# Patient Record
Sex: MALE | Race: OTHER | Employment: OTHER | URBAN - METROPOLITAN AREA
[De-identification: names, ages, dates, MRNs, and addresses within clinical notes are randomized per-mention and may not be internally consistent; named-entity substitution may affect disease eponyms.]

---

## 2017-09-05 ENCOUNTER — HOSPITAL ENCOUNTER (OUTPATIENT)
Dept: GENERAL RADIOLOGY | Age: 82
Discharge: HOME | End: 2017-09-05
Attending: FAMILY MEDICINE
Payer: MEDICARE

## 2017-09-05 DIAGNOSIS — M25.50 ARTHRALGIA: ICD-10-CM

## 2017-09-05 PROCEDURE — 73560 X-RAY EXAM OF KNEE 1 OR 2: CPT | Performed by: FAMILY MEDICINE

## 2021-01-18 ENCOUNTER — APPOINTMENT (OUTPATIENT)
Dept: GENERAL RADIOLOGY | Facility: HOSPITAL | Age: 86
End: 2021-01-18
Payer: MEDICARE

## 2021-01-18 ENCOUNTER — HOSPITAL ENCOUNTER (EMERGENCY)
Facility: HOSPITAL | Age: 86
Discharge: HOME | End: 2021-01-19
Attending: EMERGENCY MEDICINE
Payer: MEDICARE

## 2021-01-18 DIAGNOSIS — U07.1 COVID-19: Primary | ICD-10-CM

## 2021-01-18 LAB
ALBUMIN SERPL-MCNC: 2.9 G/DL (ref 3.4–5)
ALBUMIN/GLOB SERPL: 0.7 {RATIO} (ref 1–2)
ALP LIVER SERPL-CCNC: 80 U/L
ALT SERPL-CCNC: 49 U/L
ANION GAP SERPL CALC-SCNC: 8 MMOL/L (ref 0–18)
AST SERPL-CCNC: 36 U/L (ref 15–37)
BASOPHILS # BLD AUTO: 0.02 X10(3) UL (ref 0–0.2)
BASOPHILS NFR BLD AUTO: 0.2 %
BILIRUB SERPL-MCNC: 0.5 MG/DL (ref 0.1–2)
BUN BLD-MCNC: 19 MG/DL (ref 7–18)
BUN/CREAT SERPL: 21.1 (ref 10–20)
CALCIUM BLD-MCNC: 8.9 MG/DL (ref 8.5–10.1)
CHLORIDE SERPL-SCNC: 97 MMOL/L (ref 98–112)
CO2 SERPL-SCNC: 24 MMOL/L (ref 21–32)
CREAT BLD-MCNC: 0.9 MG/DL
DEPRECATED RDW RBC AUTO: 39.8 FL (ref 35.1–46.3)
EOSINOPHIL # BLD AUTO: 0.01 X10(3) UL (ref 0–0.7)
EOSINOPHIL NFR BLD AUTO: 0.1 %
ERYTHROCYTE [DISTWIDTH] IN BLOOD BY AUTOMATED COUNT: 12.4 % (ref 11–15)
GLOBULIN PLAS-MCNC: 4.3 G/DL (ref 2.8–4.4)
GLUCOSE BLD-MCNC: 317 MG/DL (ref 70–99)
HCT VFR BLD AUTO: 42.2 %
HGB BLD-MCNC: 15.1 G/DL
IMM GRANULOCYTES # BLD AUTO: 0.07 X10(3) UL (ref 0–1)
IMM GRANULOCYTES NFR BLD: 0.8 %
LYMPHOCYTES # BLD AUTO: 0.5 X10(3) UL (ref 1–4)
LYMPHOCYTES NFR BLD AUTO: 5.5 %
M PROTEIN MFR SERPL ELPH: 7.2 G/DL (ref 6.4–8.2)
MCH RBC QN AUTO: 31.5 PG (ref 26–34)
MCHC RBC AUTO-ENTMCNC: 35.8 G/DL (ref 31–37)
MCV RBC AUTO: 88.1 FL
MONOCYTES # BLD AUTO: 0.5 X10(3) UL (ref 0.1–1)
MONOCYTES NFR BLD AUTO: 5.5 %
NEUTROPHILS # BLD AUTO: 7.97 X10 (3) UL (ref 1.5–7.7)
NEUTROPHILS # BLD AUTO: 7.97 X10(3) UL (ref 1.5–7.7)
NEUTROPHILS NFR BLD AUTO: 87.9 %
OSMOLALITY SERPL CALC.SUM OF ELEC: 282 MOSM/KG (ref 275–295)
PLATELET # BLD AUTO: 176 10(3)UL (ref 150–450)
POTASSIUM SERPL-SCNC: 4 MMOL/L (ref 3.5–5.1)
RBC # BLD AUTO: 4.79 X10(6)UL
SARS-COV-2 RNA RESP QL NAA+PROBE: DETECTED
SODIUM SERPL-SCNC: 129 MMOL/L (ref 136–145)
WBC # BLD AUTO: 9.1 X10(3) UL (ref 4–11)

## 2021-01-18 PROCEDURE — 99284 EMERGENCY DEPT VISIT MOD MDM: CPT

## 2021-01-18 PROCEDURE — 71045 X-RAY EXAM CHEST 1 VIEW: CPT | Performed by: EMERGENCY MEDICINE

## 2021-01-18 PROCEDURE — 80053 COMPREHEN METABOLIC PANEL: CPT | Performed by: EMERGENCY MEDICINE

## 2021-01-18 PROCEDURE — 85025 COMPLETE CBC W/AUTO DIFF WBC: CPT | Performed by: EMERGENCY MEDICINE

## 2021-01-18 RX ORDER — METHYLPREDNISOLONE 4 MG/1
4 TABLET ORAL AS DIRECTED
COMMUNITY
End: 2021-05-04

## 2021-01-18 RX ORDER — BENZONATATE 100 MG/1
100 CAPSULE ORAL 3 TIMES DAILY PRN
Qty: 30 CAPSULE | Refills: 0 | Status: SHIPPED | OUTPATIENT
Start: 2021-01-18 | End: 2021-02-17

## 2021-01-19 ENCOUNTER — PATIENT OUTREACH (OUTPATIENT)
Dept: CASE MANAGEMENT | Age: 86
End: 2021-01-19

## 2021-01-19 VITALS
HEIGHT: 66 IN | TEMPERATURE: 99 F | WEIGHT: 126 LBS | OXYGEN SATURATION: 95 % | HEART RATE: 66 BPM | DIASTOLIC BLOOD PRESSURE: 64 MMHG | RESPIRATION RATE: 18 BRPM | SYSTOLIC BLOOD PRESSURE: 143 MMHG | BODY MASS INDEX: 20.25 KG/M2

## 2021-01-19 NOTE — PROGRESS NOTES
Home Monitoring Condition Update    Covid19+ test date: 1/18/2021      Consent Verification:  Assessment Completed With: Other: Son Permission received per patient?  verbal  HIPAA Verified?   Yes    COVID-19 HOME MONITORING 1/19/2021   Temperature (No Kevyn experiencing any of the following: fever, weakness, difficulty breathing, hives, joint pain, rashes, itching, tongue or lip swelling or wheezing?  No  If yes, we will contact your doctor/on call provider or if you are experiencing a severe reaction, please

## 2021-01-19 NOTE — ED NOTES
Spoke with son who states that his father has not had any Covid testing but everyone at home is in fact positive. Family states that the patient's O@ sats have been 94-95%. Family states symptoms started 1/8/21 with fevers that come and go.   Son is Sergio Sic

## 2021-01-19 NOTE — ED PROVIDER NOTES
Patient Seen in: [facility] Emergency Department      History   Patient presents with:  Shortness Of Breath    Stated Complaint: sob, covid +, fever.  97% room air    HPI/Subjective:   HPI    Patient is an 82-year-old male with really no past medical supple. Cardiovascular:      Rate and Rhythm: Normal rate and regular rhythm. Heart sounds: Normal heart sounds. Pulmonary:      Effort: Pulmonary effort is normal.      Breath sounds: Normal breath sounds.       Comments: No tachypnea, no distress TECHNIQUE:  AP chest radiograph was obtained. COMPARISON:  None. INDICATIONS:  sob, covid +, fever.  97% room air  PATIENT STATED HISTORY: (As transcribed by Technologist)  Patient here with cough, onset 8-9 days ago, tested positive for covid 3  days ago shortness of breath, palpitations, chest pain. Infusion was paused for about 15 minutes, his heart rate remained rate about 100. It was restarted and now about nursing home in and is still about 100. Does not seem that this is related to the infusion at all.

## 2021-01-19 NOTE — ED INITIAL ASSESSMENT (HPI)
Patient here with cough, onset 8-9 days ago, tested positive for covid 3 days ago. Patient states O2 sat at home 94-95% and instructed by PMD to come in for further evaluation.

## 2021-01-19 NOTE — ED NOTES
Patient now states he is unsure whether he had a covid test at his doctor's office but states everyone at home has Covid.   Patient staets he has had a cough whenever he talks but denies any shortness of breath and states he is moving around without any dif

## 2021-01-20 ENCOUNTER — PATIENT OUTREACH (OUTPATIENT)
Dept: CASE MANAGEMENT | Age: 86
End: 2021-01-20

## 2021-01-20 NOTE — PROGRESS NOTES
Home Monitoring Condition Update    Covid19+ test date: 1/18/2021      Consent Verification:  Assessment Completed With: Other: Son Permission received per patient?  verbal  HIPAA Verified?   Yes    COVID-19 HOME MONITORING 1/20/2021   Temperature (No Kevyn experiencing any of the following: fever, weakness, difficulty breathing, hives, joint pain, rashes, itching, tongue or lip swelling or wheezing?  No  If yes, we will contact your doctor/on call provider or if you are experiencing a severe reaction, please

## 2021-05-04 ENCOUNTER — APPOINTMENT (OUTPATIENT)
Dept: GENERAL RADIOLOGY | Facility: HOSPITAL | Age: 86
DRG: 393 | End: 2021-05-04
Attending: EMERGENCY MEDICINE
Payer: MEDICARE

## 2021-05-04 ENCOUNTER — HOSPITAL ENCOUNTER (INPATIENT)
Facility: HOSPITAL | Age: 86
LOS: 2 days | Discharge: HOME | DRG: 393 | End: 2021-05-06
Attending: EMERGENCY MEDICINE | Admitting: HOSPITALIST
Payer: MEDICARE

## 2021-05-04 DIAGNOSIS — K92.1 HEMATOCHEZIA: ICD-10-CM

## 2021-05-04 DIAGNOSIS — K92.2 GASTROINTESTINAL HEMORRHAGE, UNSPECIFIED GASTROINTESTINAL HEMORRHAGE TYPE: Primary | ICD-10-CM

## 2021-05-04 DIAGNOSIS — D64.9 ANEMIA, UNSPECIFIED TYPE: ICD-10-CM

## 2021-05-04 PROCEDURE — 99223 1ST HOSP IP/OBS HIGH 75: CPT | Performed by: INTERNAL MEDICINE

## 2021-05-04 PROCEDURE — 71045 X-RAY EXAM CHEST 1 VIEW: CPT | Performed by: EMERGENCY MEDICINE

## 2021-05-04 RX ORDER — ONDANSETRON 2 MG/ML
4 INJECTION INTRAMUSCULAR; INTRAVENOUS EVERY 6 HOURS PRN
Status: DISCONTINUED | OUTPATIENT
Start: 2021-05-04 | End: 2021-05-06

## 2021-05-04 RX ORDER — SODIUM CHLORIDE 9 MG/ML
INJECTION, SOLUTION INTRAVENOUS CONTINUOUS
Status: DISCONTINUED | OUTPATIENT
Start: 2021-05-04 | End: 2021-05-06

## 2021-05-04 RX ORDER — METOCLOPRAMIDE HYDROCHLORIDE 5 MG/ML
10 INJECTION INTRAMUSCULAR; INTRAVENOUS EVERY 8 HOURS PRN
Status: DISCONTINUED | OUTPATIENT
Start: 2021-05-04 | End: 2021-05-06

## 2021-05-04 RX ORDER — MELATONIN
3 NIGHTLY PRN
Status: DISCONTINUED | OUTPATIENT
Start: 2021-05-04 | End: 2021-05-06

## 2021-05-04 RX ORDER — ACETAMINOPHEN 325 MG/1
650 TABLET ORAL EVERY 6 HOURS PRN
Status: DISCONTINUED | OUTPATIENT
Start: 2021-05-04 | End: 2021-05-06

## 2021-05-04 NOTE — H&P
[facility]  History and Physical     Mat Misa Patient Status:  Emergency    3/5/1935 MRN [id number]   Location [address] [facility] Attending Deion Schmidt MD   Hosp Day # 0 PCP Bayron Prather MD     Chief Complaint: b /66   Pulse (!) 126   Temp 98.7 °F (37.1 °C)   Resp 22   Ht 5' 6\" (1.676 m)   Wt 130 lb (59 kg)   SpO2 98%   BMI 20.98 kg/m²   General: No acute distress. Alert and oriented x 3. HEENT: Normocephalic atraumatic. Moist mucous membranes.    Neck: No above  1. H/H q 8 hours  2. Transfuse for Hb <7 or sooner if hemodynamically unstable  3. Sinus Tachycardia 2/2 above  1.  Telemetry     Quality:  · DVT Prophylaxis: SCDs  · CODE status: Full  · Arzate: no  · If COVID testing is negative, may discontinue iso

## 2021-05-04 NOTE — ED PROVIDER NOTES
Patient Seen in: [facility] Emergency Department      History   Patient presents with:  GI Bleeding    Stated Complaint: GI BLEED X 2 EPISODES , + DIZZINESS, DENIES BLOOD THINNERS, CURRENTLY FASTING     HPI/Subjective:   HPI    68-year-old male come murmurs, regular rate and rhythm  Lungs: Clear to auscultation bilaterally  Abdomen: Soft nontender nondistended normal active bowel sounds without rebound, guarding or masses noted  Back nontender without CVA tenderness  Extremity no clubbing, cyanosis or -----------         ------                     82 Kate Dawkins (BLOOD [facility])[[id number]]                               Final result               ANTIBODY SCREEN[[id number]]                                  Final result                 Please view results for these nakul MDM      As above  Admission disposition: 5/4/2021  5:18 PM                                  Disposition and Plan     Clinical Impression:  Gastrointestinal hemorrhage, unspecified gastrointestinal hemorrhage type  (primary encounter diagnosis)     Dis

## 2021-05-05 ENCOUNTER — ANESTHESIA EVENT (OUTPATIENT)
Dept: ENDOSCOPY | Facility: HOSPITAL | Age: 86
DRG: 393 | End: 2021-05-05
Payer: MEDICARE

## 2021-05-05 PROCEDURE — 99221 1ST HOSP IP/OBS SF/LOW 40: CPT | Performed by: INTERNAL MEDICINE

## 2021-05-05 PROCEDURE — 99232 SBSQ HOSP IP/OBS MODERATE 35: CPT | Performed by: INTERNAL MEDICINE

## 2021-05-05 RX ORDER — METOPROLOL TARTRATE 5 MG/5ML
INJECTION INTRAVENOUS
Status: DISPENSED
Start: 2021-05-05 | End: 2021-05-05

## 2021-05-05 RX ORDER — METOPROLOL TARTRATE 5 MG/5ML
5 INJECTION INTRAVENOUS ONCE
Status: DISCONTINUED | OUTPATIENT
Start: 2021-05-05 | End: 2021-05-06

## 2021-05-05 NOTE — PLAN OF CARE
Patient is alert and oriented x 4. Denies pain/discomfort at this time. No bowel movements overnight. No complaints of dizziness. Hgb q8h. Clear liquid diet. Made NPO at midnight.       Problem: Patient/Family Goals  Goal: Patient/Family Long Term Goal and response  - Establish method for patient to ask for assistance (call light)  - Provide an  as needed  - Communicate barriers and strategies to overcome with those who interact with patient  5/5/2021 0153 by Rashid Aldana  Outcome: Progressin

## 2021-05-05 NOTE — CONSULTS
[facility]    Report of Cardiology Consultation    Marjory Dakin Patient Status:  Inpatient    3/5/1935 MRN [id number]   [facility] 7NE-A Attending Daniel Vegas MD   Hosp Day # 1 PCP Demetri Mcqueen MD     Date of Admission:   Not on file    Other Topics            Concern    None on file    Social History Narrative    None on file            Current Medications:  metoprolol Tartrate (LOPRESSOR) injection 5 mg, 5 mg, Intravenous, Once  Pantoprazole Sodium (PROTONIX) 40 mg in Sod 05/05/2021     (H) 05/05/2021    CA 7.9 (L) 05/05/2021    ALB 3.2 (L) 05/04/2021    ALKPHO 59 05/04/2021    TP 6.3 (L) 05/04/2021    AST 17 05/04/2021    ALT 21 05/04/2021    PTT 25.8 05/04/2021    INR 1.04 05/04/2021    PTP 13.9 05/04/2021    LIP 6

## 2021-05-05 NOTE — PLAN OF CARE
Assumed care at  0730  A&Ox4, VSS  HR noted to be up to 140s with minimal activity. Pt reports mild dyspnea. EKG confirmed accelerated junctional. Cardiology consulted  Bowel prep started 1600. 1 dark maroon/black stool noted  Plan for procedure tomorrow.

## 2021-05-05 NOTE — PROGRESS NOTES
[facility]  Progress Note     Alexey Velasquez Patient Status:  Inpatient    3/5/1935 MRN [id number]   [facility] 7NE-A Attending Deanna Lobo MD   Hosp Day # 1 PCP Geronimo Braden MD     Chief Complaint: GI bleed    S: Patient w 05/04/2021    COVID19 Detected (A) 01/18/2021       Pro-Calcitonin  No results for input(s): PCT in the last 168 hours. Cardiac  Recent Labs   Lab 05/04/21  1616   TROP <0.045       Creatinine Kinase  No results for input(s): CK in the last 168 hours.

## 2021-05-05 NOTE — CONSULTS
[facility]                       Gastroenterology [address] [facility]    Emmie Wu Patient Status:  Inpatient    3/5/1935 MRN [id number]   [facility] 7NE-A Attending Nan Bird MD   Hosp Day # 1 PCP Rosaline Roth 1,000 mL, Intravenous, Once  Pantoprazole Sodium (PROTONIX) 40 mg in Sodium Chloride (PF) 0.9 % 10 mL IV push, 40 mg, Intravenous, Q12H  0.9% NaCl infusion, , Intravenous, Continuous  acetaminophen (TYLENOL) tab 650 mg, 650 mg, Oral, Q6H PRN  melatonin tab seizure, stroke, or frequent headaches  PE: /56 (BP Location: Left arm)   Pulse 98   Temp 98.2 °F (36.8 °C) (Oral)   Resp 14   Ht 5' 6\" (1.676 m)   Wt 122 lb (55.3 kg)   SpO2 96%   BMI 19.69 kg/m²   Gen: AAO x 3, able to speak in complete sentences Impression: 82year old male with no PMhx and no chronic medications admitted yesterday with bloody stool x2, dark maroon and red blood mixed with stool. No pain however pt with epigastric discomfort a few days prior.  + 5 lb wt loss over the last mon SOB. No prior endoscopic evaluation. No fhx of crc. No nsaids. No anti-thrombotic agents. AOx3. Abd NTND;      Hg 11.7.  Plts 148; INR 1.04  BUN 41      Ddx includes PUD, gastritis for UGI sx; r/o GI malignancy, AVMs, hemorrhoids, diverticula, ulcer;      P

## 2021-05-06 ENCOUNTER — APPOINTMENT (OUTPATIENT)
Dept: CV DIAGNOSTICS | Facility: HOSPITAL | Age: 86
DRG: 393 | End: 2021-05-06
Attending: INTERNAL MEDICINE
Payer: MEDICARE

## 2021-05-06 ENCOUNTER — ANESTHESIA (OUTPATIENT)
Dept: ENDOSCOPY | Facility: HOSPITAL | Age: 86
DRG: 393 | End: 2021-05-06
Payer: MEDICARE

## 2021-05-06 VITALS
HEIGHT: 66 IN | OXYGEN SATURATION: 100 % | WEIGHT: 122 LBS | TEMPERATURE: 98 F | HEART RATE: 94 BPM | RESPIRATION RATE: 17 BRPM | DIASTOLIC BLOOD PRESSURE: 49 MMHG | SYSTOLIC BLOOD PRESSURE: 114 MMHG | BODY MASS INDEX: 19.61 KG/M2

## 2021-05-06 PROCEDURE — 99232 SBSQ HOSP IP/OBS MODERATE 35: CPT | Performed by: NURSE PRACTITIONER

## 2021-05-06 PROCEDURE — 99239 HOSP IP/OBS DSCHRG MGMT >30: CPT | Performed by: INTERNAL MEDICINE

## 2021-05-06 PROCEDURE — 0DB98ZX EXCISION OF DUODENUM, VIA NATURAL OR ARTIFICIAL OPENING ENDOSCOPIC, DIAGNOSTIC: ICD-10-PCS | Performed by: STUDENT IN AN ORGANIZED HEALTH CARE EDUCATION/TRAINING PROGRAM

## 2021-05-06 PROCEDURE — 0DBK8ZZ EXCISION OF ASCENDING COLON, VIA NATURAL OR ARTIFICIAL OPENING ENDOSCOPIC: ICD-10-PCS | Performed by: STUDENT IN AN ORGANIZED HEALTH CARE EDUCATION/TRAINING PROGRAM

## 2021-05-06 PROCEDURE — 0DBH8ZZ EXCISION OF CECUM, VIA NATURAL OR ARTIFICIAL OPENING ENDOSCOPIC: ICD-10-PCS | Performed by: STUDENT IN AN ORGANIZED HEALTH CARE EDUCATION/TRAINING PROGRAM

## 2021-05-06 PROCEDURE — 0DB78ZX EXCISION OF STOMACH, PYLORUS, VIA NATURAL OR ARTIFICIAL OPENING ENDOSCOPIC, DIAGNOSTIC: ICD-10-PCS | Performed by: STUDENT IN AN ORGANIZED HEALTH CARE EDUCATION/TRAINING PROGRAM

## 2021-05-06 PROCEDURE — 93306 TTE W/DOPPLER COMPLETE: CPT | Performed by: INTERNAL MEDICINE

## 2021-05-06 RX ORDER — NALOXONE HYDROCHLORIDE 0.4 MG/ML
80 INJECTION, SOLUTION INTRAMUSCULAR; INTRAVENOUS; SUBCUTANEOUS AS NEEDED
Status: DISCONTINUED | OUTPATIENT
Start: 2021-05-06 | End: 2021-05-06 | Stop reason: HOSPADM

## 2021-05-06 RX ORDER — HYDROCODONE BITARTRATE AND ACETAMINOPHEN 10; 325 MG/1; MG/1
1 TABLET ORAL AS NEEDED
Status: DISCONTINUED | OUTPATIENT
Start: 2021-05-06 | End: 2021-05-06 | Stop reason: HOSPADM

## 2021-05-06 RX ORDER — LIDOCAINE HYDROCHLORIDE 10 MG/ML
INJECTION, SOLUTION EPIDURAL; INFILTRATION; INTRACAUDAL; PERINEURAL AS NEEDED
Status: DISCONTINUED | OUTPATIENT
Start: 2021-05-06 | End: 2021-05-06 | Stop reason: SURG

## 2021-05-06 RX ORDER — HYDROMORPHONE HYDROCHLORIDE 1 MG/ML
0.4 INJECTION, SOLUTION INTRAMUSCULAR; INTRAVENOUS; SUBCUTANEOUS EVERY 5 MIN PRN
Status: DISCONTINUED | OUTPATIENT
Start: 2021-05-06 | End: 2021-05-06 | Stop reason: HOSPADM

## 2021-05-06 RX ORDER — METOPROLOL SUCCINATE 25 MG/1
25 TABLET, EXTENDED RELEASE ORAL
Status: DISCONTINUED | OUTPATIENT
Start: 2021-05-06 | End: 2021-05-06

## 2021-05-06 RX ORDER — DEXTROSE MONOHYDRATE 25 G/50ML
50 INJECTION, SOLUTION INTRAVENOUS
Status: DISCONTINUED | OUTPATIENT
Start: 2021-05-06 | End: 2021-05-06 | Stop reason: HOSPADM

## 2021-05-06 RX ORDER — PANTOPRAZOLE SODIUM 40 MG/1
40 TABLET, DELAYED RELEASE ORAL
Qty: 60 TABLET | Refills: 0 | Status: SHIPPED | OUTPATIENT
Start: 2021-05-06 | End: 2021-05-24 | Stop reason: CLARIF

## 2021-05-06 RX ORDER — ONDANSETRON 2 MG/ML
4 INJECTION INTRAMUSCULAR; INTRAVENOUS AS NEEDED
Status: DISCONTINUED | OUTPATIENT
Start: 2021-05-06 | End: 2021-05-06 | Stop reason: HOSPADM

## 2021-05-06 RX ORDER — PANTOPRAZOLE SODIUM 40 MG/1
40 TABLET, DELAYED RELEASE ORAL
Status: DISCONTINUED | OUTPATIENT
Start: 2021-05-06 | End: 2021-05-06

## 2021-05-06 RX ORDER — SODIUM CHLORIDE, SODIUM LACTATE, POTASSIUM CHLORIDE, CALCIUM CHLORIDE 600; 310; 30; 20 MG/100ML; MG/100ML; MG/100ML; MG/100ML
INJECTION, SOLUTION INTRAVENOUS CONTINUOUS
Status: DISCONTINUED | OUTPATIENT
Start: 2021-05-06 | End: 2021-05-06

## 2021-05-06 RX ORDER — HYDROCODONE BITARTRATE AND ACETAMINOPHEN 10; 325 MG/1; MG/1
2 TABLET ORAL AS NEEDED
Status: DISCONTINUED | OUTPATIENT
Start: 2021-05-06 | End: 2021-05-06 | Stop reason: HOSPADM

## 2021-05-06 RX ORDER — PHENYLEPHRINE HCL 10 MG/ML
VIAL (ML) INJECTION AS NEEDED
Status: DISCONTINUED | OUTPATIENT
Start: 2021-05-06 | End: 2021-05-06 | Stop reason: SURG

## 2021-05-06 RX ORDER — METOCLOPRAMIDE HYDROCHLORIDE 5 MG/ML
10 INJECTION INTRAMUSCULAR; INTRAVENOUS AS NEEDED
Status: DISCONTINUED | OUTPATIENT
Start: 2021-05-06 | End: 2021-05-06 | Stop reason: HOSPADM

## 2021-05-06 RX ORDER — METOPROLOL SUCCINATE 25 MG/1
25 TABLET, EXTENDED RELEASE ORAL
Qty: 30 TABLET | Refills: 1 | Status: SHIPPED | OUTPATIENT
Start: 2021-05-06

## 2021-05-06 RX ORDER — MAGNESIUM OXIDE 400 MG (241.3 MG MAGNESIUM) TABLET
400 TABLET ONCE
Status: DISCONTINUED | OUTPATIENT
Start: 2021-05-06 | End: 2021-05-06

## 2021-05-06 RX ORDER — SODIUM CHLORIDE, SODIUM LACTATE, POTASSIUM CHLORIDE, CALCIUM CHLORIDE 600; 310; 30; 20 MG/100ML; MG/100ML; MG/100ML; MG/100ML
INJECTION, SOLUTION INTRAVENOUS CONTINUOUS PRN
Status: DISCONTINUED | OUTPATIENT
Start: 2021-05-06 | End: 2021-05-06 | Stop reason: SURG

## 2021-05-06 RX ADMIN — PHENYLEPHRINE HCL 100 MCG: 10 MG/ML VIAL (ML) INJECTION at 12:25:00

## 2021-05-06 RX ADMIN — PHENYLEPHRINE HCL 100 MCG: 10 MG/ML VIAL (ML) INJECTION at 12:30:00

## 2021-05-06 RX ADMIN — LIDOCAINE HYDROCHLORIDE 25 MG: 10 INJECTION, SOLUTION EPIDURAL; INFILTRATION; INTRACAUDAL; PERINEURAL at 11:53:00

## 2021-05-06 RX ADMIN — SODIUM CHLORIDE, SODIUM LACTATE, POTASSIUM CHLORIDE, CALCIUM CHLORIDE: 600; 310; 30; 20 INJECTION, SOLUTION INTRAVENOUS at 11:53:00

## 2021-05-06 NOTE — DISCHARGE SUMMARY
[facility]  DISCHARGE SUMMARY     Lilia Leventhal Patient Status:  Inpatient    3/5/1935 MRN [id number]   [facility] 7NE-A Attending Eliu Reed MD   Hosp Day # 2 PCP Jed Mcbride MD     Date of Admission: 2021  Date of Discharge:   · none    Consultants:  • GI, Cardiology    Discharge Medication List:     Discharge Medications      START taking these medications      Instructions Prescription details   Metoprolol Succinate ER 25 MG Tb24  Commonly known as:  Toprol XL

## 2021-05-06 NOTE — ANESTHESIA PREPROCEDURE EVALUATION
PRE-OP EVALUATION    Patient Name: Paras Kovacs    Admit Diagnosis: Gastrointestinal hemorrhage, unspecified gastrointestinal hemorrhage type [K92.2]    Pre-op Diagnosis: Hematochezia [K92.1]  Anemia, unspecified type [D64.9]    ESOPHAGOGASTRODUODENOSCOPY Laterality Date   • REMOVAL GALLBLADDER       Social History    Tobacco Use      Smoking status: Never Smoker      Smokeless tobacco: Never Used    Alcohol use: Never      Drug use: Unknown     Available pre-op labs reviewed.   Lab Results   Component Value

## 2021-05-06 NOTE — PLAN OF CARE
NURSING DISCHARGE NOTE    Discharged Home via Wheelchair. Accompanied by Family member  Belongings Taken by patient/family.   PIV and tele dc'd  Medication changes, follow up appointments, and DC instructions reviewed  Questions and concerns addressed

## 2021-05-06 NOTE — PROGRESS NOTES
[facility] Note: Route Optimization for Pantoprazole (PROTONIX)    Patient is currently on Pantoprazole (PROTONIX) 40 mg IV every 12 hours.    The patient meets the criteria to convert to the oral equivalent as established by the IV to Oral conversion pro

## 2021-05-06 NOTE — OPERATIVE REPORT
EGD Operative Report  Patient Name: Lilia Leventhal  YOB: 1935  MRN: [id number]  Procedure: Esophagogastroduodenoscopy (EGD)  + biopsies  Pre-operative Diagnosis & Indication:   1. Iron Deficiency Anemia   2.  Hematochezia  Post-operative Diagnos adequate sedation was achieved, a bite block was placed and a lubricated tip of an Olympus video upper endoscope was inserted through the oropharynx and gently manipulated through the esophagus into the stomach and the second portion of the duodenum.  Upon today    [place]  5/6/2021  12:05 PM

## 2021-05-06 NOTE — PROGRESS NOTES
[facility]  Cardiology Progress Note    Niesha Bueno Patient Status:  Inpatient    3/5/1935 MRN [id number]   [facility] 7NE-A Attending Beth Bronson MD   [facility] Day # 2 PCP Ruben Dean MD       Subjective:  No chest pain or sh clyde. Echo with preserved lv function, no rwma, mild mr  • GIB- scopes reveal internal hemorrhoids, colon polyps, duodenal ulcer, mild gastritis  • CEFERINO        Plan:     • Would decrease bb dose to 25 mg daily  • Follow tele for recurrent arrhythmias  • Re

## 2021-05-06 NOTE — OPERATIVE REPORT
Colonoscopy Operative Report  Patient Name: Amara Lee  YOB: 1935  MRN: [id number]  Procedure: Colonoscopy w/ polypectomy  Pre-operative Diagnosis & Indication:   1. CEFERINO  2. Hematohcezia  Post-operative Diagnosis:   1. Colon Polyps x 2  2. lubricated tip of the  colonoscope was then introduced into the rectum and advanced to the terminal ileum. The appendiceal orifice and ileocecal valve were clearly and distinctly visualized, thus verifying the cecum. The terminal ileum was intubated.   Angela Lewis hematochezia. Recommendations:    - Post Colonoscopy/polypectomy precautions, watch for bleeding, infection, perforation, adverse drug reactions.    - High fiber diet  - Await pathology results  - Iron supplementation once daily  - Repeat colonoscopy in 3

## 2021-05-06 NOTE — ANESTHESIA POSTPROCEDURE EVALUATION
Fritz Worthy 1 Patient Status:  Inpatient   Age/Gender 82year old male MRN [id number]   Location [address] [place] [address] Attending Candis Molina MD   [facility] Day # 2 PCP Chucky Wilkins MD       Anesthesia Post-op Note

## 2021-05-06 NOTE — PROGRESS NOTES
[facility]  Progress Note     Niesha Bueno Patient Status:  Inpatient    3/5/1935 MRN [id number]   [facility] 7NE-A Attending Beth Bronson MD   Hosp Day # 2 PCP Ruben Dean MD     Chief Complaint: GI bleed    S: Patient w Clearance: 61.4 mL/min (based on SCr of 0.7 mg/dL).     Recent Labs   Lab 05/04/21  1616   PTP 13.9   INR 1.04            COVID-19 Lab Results    COVID-19  Lab Results   Component Value Date    COVID19 Not Detected 05/04/2021    COVID19 Detected (A) 01/18/2

## 2021-05-06 NOTE — PLAN OF CARE
Patient alert and oriented x 4. Denies pain/discomfort at this time. Golytely bowel prep. Patient states stools have been clear but flushes before RN can assess. Consent for EGD/colonoscopy signed and in patient chart. Made NPO at midnight.     0100- p when possible  - Listen attentively, be patient, do not interrupt  - Minimize distractions  - Allow time for understanding and response  - Establish method for patient to ask for assistance (call light)  - Provide an  as needed  - Communicate ba

## 2021-05-11 ENCOUNTER — HOSPITAL ENCOUNTER (EMERGENCY)
Facility: HOSPITAL | Age: 86
Discharge: HOME | End: 2021-05-11
Attending: EMERGENCY MEDICINE
Payer: MEDICARE

## 2021-05-11 ENCOUNTER — APPOINTMENT (OUTPATIENT)
Dept: GENERAL RADIOLOGY | Facility: HOSPITAL | Age: 86
End: 2021-05-11
Attending: EMERGENCY MEDICINE
Payer: MEDICARE

## 2021-05-11 VITALS
TEMPERATURE: 99 F | SYSTOLIC BLOOD PRESSURE: 145 MMHG | HEIGHT: 66 IN | HEART RATE: 59 BPM | OXYGEN SATURATION: 100 % | DIASTOLIC BLOOD PRESSURE: 69 MMHG | RESPIRATION RATE: 20 BRPM | WEIGHT: 122 LBS | BODY MASS INDEX: 19.61 KG/M2

## 2021-05-11 DIAGNOSIS — R60.9 PERIPHERAL EDEMA: Primary | ICD-10-CM

## 2021-05-11 PROCEDURE — 93010 ELECTROCARDIOGRAM REPORT: CPT

## 2021-05-11 PROCEDURE — 71045 X-RAY EXAM CHEST 1 VIEW: CPT | Performed by: EMERGENCY MEDICINE

## 2021-05-11 PROCEDURE — 84484 ASSAY OF TROPONIN QUANT: CPT | Performed by: EMERGENCY MEDICINE

## 2021-05-11 PROCEDURE — 99284 EMERGENCY DEPT VISIT MOD MDM: CPT

## 2021-05-11 PROCEDURE — 83880 ASSAY OF NATRIURETIC PEPTIDE: CPT | Performed by: EMERGENCY MEDICINE

## 2021-05-11 PROCEDURE — 96374 THER/PROPH/DIAG INJ IV PUSH: CPT

## 2021-05-11 PROCEDURE — 85025 COMPLETE CBC W/AUTO DIFF WBC: CPT | Performed by: EMERGENCY MEDICINE

## 2021-05-11 PROCEDURE — 93005 ELECTROCARDIOGRAM TRACING: CPT

## 2021-05-11 PROCEDURE — 80053 COMPREHEN METABOLIC PANEL: CPT | Performed by: EMERGENCY MEDICINE

## 2021-05-11 RX ORDER — FUROSEMIDE 20 MG/1
20 TABLET ORAL 2 TIMES DAILY
Qty: 5 TABLET | Refills: 0 | Status: SHIPPED | OUTPATIENT
Start: 2021-05-11 | End: 2021-05-24 | Stop reason: CLARIF

## 2021-05-11 RX ORDER — FUROSEMIDE 10 MG/ML
40 INJECTION INTRAMUSCULAR; INTRAVENOUS ONCE
Status: COMPLETED | OUTPATIENT
Start: 2021-05-11 | End: 2021-05-11

## 2021-05-11 NOTE — ED INITIAL ASSESSMENT (HPI)
Pt reports he was discharged from the hospital on Thursday for a GI bleed and states reports both his legs started to swell two days ago.

## 2021-05-12 NOTE — ED PROVIDER NOTES
Patient Seen in: [facility] Emergency Department      History   Patient presents with:  Swelling Edema    Stated Complaint: dx on thursday for GI bleed. pt states bilateral swollen legs.       HPI/Subjective:   HPI    42-year-old male presents emerg Exam    All measures to prevent infection transmission during my interaction with the patient were taken. The patient was already wearing a droplet mask on my arrival to the room.  Personal protective equipment including droplet mask, eye protection, and gl Status                     ---------                               -----------         ------                     CBC W/ DIFFERENTIAL[[id number]]          Abnormal            Final result                 Please view results for these tests on the indiv Prescribed:  Discharge Medication List as of 5/11/2021 10:31 PM    START taking these medications    furosemide 20 MG Oral Tab  Take 1 tablet (20 mg total) by mouth 2 (two) times daily. , Normal, Disp-5 tablet, R-0

## 2021-05-18 NOTE — PROGRESS NOTES
Colonoscopy 3 MONTHS, 179[address] [facility] recently had an upper endoscopy (EGD) procedure completed with biopsies of the stomach and small intestine and a colonoscopy procedure completed with polyps removed.      The biopsies obtain

## 2021-05-24 ENCOUNTER — HOSPITAL ENCOUNTER (INPATIENT)
Facility: HOSPITAL | Age: 86
LOS: 8 days | Discharge: HOME HEALTH | DRG: 234 | End: 2021-06-01
Attending: EMERGENCY MEDICINE | Admitting: HOSPITALIST
Payer: MEDICARE

## 2021-05-24 ENCOUNTER — APPOINTMENT (OUTPATIENT)
Dept: GENERAL RADIOLOGY | Facility: HOSPITAL | Age: 86
DRG: 234 | End: 2021-05-24
Attending: EMERGENCY MEDICINE
Payer: MEDICARE

## 2021-05-24 DIAGNOSIS — I21.9 ACUTE MYOCARDIAL INFARCTION, UNSPECIFIED MI TYPE, UNSPECIFIED ARTERY (HCC): ICD-10-CM

## 2021-05-24 DIAGNOSIS — R07.9 CHEST PAIN OF UNCERTAIN ETIOLOGY: Primary | ICD-10-CM

## 2021-05-24 PROBLEM — R73.9 HYPERGLYCEMIA: Status: ACTIVE | Noted: 2021-05-24

## 2021-05-24 PROBLEM — D64.9 ANEMIA: Status: ACTIVE | Noted: 2021-05-24

## 2021-05-24 PROCEDURE — 71045 X-RAY EXAM CHEST 1 VIEW: CPT | Performed by: EMERGENCY MEDICINE

## 2021-05-24 PROCEDURE — 99223 1ST HOSP IP/OBS HIGH 75: CPT | Performed by: INTERNAL MEDICINE

## 2021-05-24 PROCEDURE — 99221 1ST HOSP IP/OBS SF/LOW 40: CPT | Performed by: INTERNAL MEDICINE

## 2021-05-24 RX ORDER — HEPARIN SODIUM AND DEXTROSE 10000; 5 [USP'U]/100ML; G/100ML
12 INJECTION INTRAVENOUS ONCE
Status: COMPLETED | OUTPATIENT
Start: 2021-05-24 | End: 2021-05-24

## 2021-05-24 RX ORDER — ASPIRIN 81 MG/1
324 TABLET, CHEWABLE ORAL ONCE
Status: COMPLETED | OUTPATIENT
Start: 2021-05-24 | End: 2021-05-24

## 2021-05-24 RX ORDER — ONDANSETRON 2 MG/ML
4 INJECTION INTRAMUSCULAR; INTRAVENOUS EVERY 4 HOURS PRN
Status: DISCONTINUED | OUTPATIENT
Start: 2021-05-24 | End: 2021-05-24

## 2021-05-24 RX ORDER — METOCLOPRAMIDE HYDROCHLORIDE 5 MG/ML
10 INJECTION INTRAMUSCULAR; INTRAVENOUS EVERY 8 HOURS PRN
Status: DISCONTINUED | OUTPATIENT
Start: 2021-05-24 | End: 2021-05-26

## 2021-05-24 RX ORDER — NITROGLYCERIN 20 MG/100ML
INJECTION INTRAVENOUS CONTINUOUS
Status: DISCONTINUED | OUTPATIENT
Start: 2021-05-24 | End: 2021-05-24

## 2021-05-24 RX ORDER — ACETAMINOPHEN 325 MG/1
650 TABLET ORAL EVERY 6 HOURS PRN
Status: DISCONTINUED | OUTPATIENT
Start: 2021-05-24 | End: 2021-05-26

## 2021-05-24 RX ORDER — ASPIRIN 325 MG
325 TABLET ORAL DAILY
Status: DISCONTINUED | OUTPATIENT
Start: 2021-05-24 | End: 2021-05-24

## 2021-05-24 RX ORDER — SODIUM CHLORIDE 9 MG/ML
INJECTION, SOLUTION INTRAVENOUS
Status: COMPLETED | OUTPATIENT
Start: 2021-05-25 | End: 2021-05-25

## 2021-05-24 RX ORDER — PANTOPRAZOLE SODIUM 40 MG/1
40 TABLET, DELAYED RELEASE ORAL
COMMUNITY
End: 2021-07-22

## 2021-05-24 RX ORDER — HEPARIN SODIUM 5000 [USP'U]/ML
60 INJECTION INTRAVENOUS; SUBCUTANEOUS ONCE
Status: COMPLETED | OUTPATIENT
Start: 2021-05-24 | End: 2021-05-24

## 2021-05-24 RX ORDER — NITROGLYCERIN 0.4 MG/1
0.4 TABLET SUBLINGUAL EVERY 5 MIN PRN
Status: DISCONTINUED | OUTPATIENT
Start: 2021-05-24 | End: 2021-05-26

## 2021-05-24 RX ORDER — NITROGLYCERIN 20 MG/100ML
5 INJECTION INTRAVENOUS CONTINUOUS
Status: DISCONTINUED | OUTPATIENT
Start: 2021-05-24 | End: 2021-05-26

## 2021-05-24 RX ORDER — ATORVASTATIN CALCIUM 80 MG/1
80 TABLET, FILM COATED ORAL NIGHTLY
Status: DISCONTINUED | OUTPATIENT
Start: 2021-05-24 | End: 2021-06-01

## 2021-05-24 RX ORDER — HEPARIN SODIUM AND DEXTROSE 10000; 5 [USP'U]/100ML; G/100ML
INJECTION INTRAVENOUS CONTINUOUS
Status: DISCONTINUED | OUTPATIENT
Start: 2021-05-24 | End: 2021-05-26

## 2021-05-24 RX ORDER — ASPIRIN 81 MG/1
81 TABLET ORAL DAILY
Status: DISCONTINUED | OUTPATIENT
Start: 2021-05-25 | End: 2021-05-25

## 2021-05-24 RX ORDER — ONDANSETRON 2 MG/ML
4 INJECTION INTRAMUSCULAR; INTRAVENOUS EVERY 6 HOURS PRN
Status: DISCONTINUED | OUTPATIENT
Start: 2021-05-24 | End: 2021-05-26

## 2021-05-24 RX ORDER — NITROGLYCERIN 0.4 MG/1
0.4 TABLET SUBLINGUAL ONCE
Status: COMPLETED | OUTPATIENT
Start: 2021-05-24 | End: 2021-05-24

## 2021-05-24 RX ORDER — MAGNESIUM HYDROXIDE/ALUMINUM HYDROXICE/SIMETHICONE 120; 1200; 1200 MG/30ML; MG/30ML; MG/30ML
30 SUSPENSION ORAL ONCE
Status: COMPLETED | OUTPATIENT
Start: 2021-05-24 | End: 2021-05-24

## 2021-05-24 RX ORDER — LIDOCAINE HYDROCHLORIDE 20 MG/ML
5 SOLUTION OROPHARYNGEAL ONCE
Status: COMPLETED | OUTPATIENT
Start: 2021-05-24 | End: 2021-05-24

## 2021-05-24 RX ORDER — ASPIRIN 81 MG/1
81 TABLET, CHEWABLE ORAL ONCE
Status: COMPLETED | OUTPATIENT
Start: 2021-05-24 | End: 2021-05-24

## 2021-05-24 NOTE — ED PROVIDER NOTES
Patient Seen in: [facility] Emergency Department      History   Patient presents with:  Chest Pain    Stated Complaint: CP    HPI/Subjective:   HPI    This is a 79-year-old male who has been taking 2 antibiotics.   He said that starting last night he now he says the chest pain is gone. The patient is in no respiratory distress. The patient is not septic or toxic    HEENT: Atraumatic, conjunctiva are not pale. There is no icterus. Oral mucosa Is wet. No facial trauma. The neck is supple.     LUNGS: The patient was placed on monitors, IV was started, blood was drawn. MDM      The EKG shows normal sinus rhythm. There is no acute ST elevations or ischemic findings.   The rest of the EKG including rate rhythm axis and intervals minutes of critical care time (exclusive of billable procedures) was administered to manage the patient's critical lab values and unstable vital signs due to his chest pain, elevated troponin.   This involved direct patient intervention, complex decision ma

## 2021-05-24 NOTE — PLAN OF CARE
NURSING ADMISSION NOTE      Patient admitted via Cart  Oriented to room. Safety precautions initiated. Bed in low position. Call light in reach. Admission navigator complete. Skin check performed with Hardik Negrete RN. Skin CDI. Pt A&Ox4.  Impaired hearing appointments  -understand potential side effects of medications  -know when to call physician with side effects   - See additional Care Plan goals for specific interventions  Outcome: Progressing  Goal: Patient/Family Short Term Goal  Description: Patient'

## 2021-05-24 NOTE — ED INITIAL ASSESSMENT (HPI)
Patient reports midsternal chest tightness radiating upwards into throat since last night, initially intermittent and now more constant. States he started tetracycline and flagyl 2 days ago and concerned this is a side effect.  No nausea/vomiting, no dizzin

## 2021-05-24 NOTE — CONSULTS
[facility]  Cardiology Consultation    Madison Corrigan Patient Status:  Emergency    3/5/1935 MRN [id number]   Location [address] [facility] [address] Attending Matt Pittman MD   Hosp Day # 0 PCP Jean-Claude Brooks MD     Reason for drugs.     Allergies:  No Known Allergies    Medications:    Current Facility-Administered Medications:   •  nitroGLYCERIN infusion 50mg in D5W 250ml, 5-400 mcg/min, Intravenous, Continuous  •  heparin (PORCINE) 38384pwhqd/250mL infusion ED INITIAL DOSE, 12 mm in the inferior lead    CXR 5/24/2021: IMPRESSION: Unremarkable portable chest radiograph. Dictated by (CST): Katie Waldron MD on 5/24/2021 at 11:03 AM     Echocardiogram 5/6/2021: Conclusions:   1. Left ventricle:  The cavity size was normal. Wall

## 2021-05-24 NOTE — PROGRESS NOTES
Admission orthos negative       05/24/21 1638 05/24/21 1640 05/24/21 1641   Vital Signs   Pulse 58 62 62   Heart Rate Source  --   --  Monitor   Resp  --   --  16   Respiratory Quality  --   --  Normal   /55 125/63 125/56   MAP (mmHg) 74 83 77   BP L

## 2021-05-24 NOTE — H&P
[facility]  History and Physical     Miguel Rogers Patient Status:  Emergency    3/5/1935 MRN [id number]   Location [address] [facility] Attending José Luis Figueroa MD   Hosp Day # 0 PCP Juan A Tamayo MD     Chief Complaint: systems was completed. Pertinent positives and negatives noted in the HPI.     Physical Exam:    /63   Pulse 65   Temp 98 °F (36.7 °C) (Temporal)   Resp 15   Ht 167.6 cm (5' 6\")   Wt 125 lb (56.7 kg)   SpO2 99%   BMI 20.18 kg/m²   General: No acut Epic.      ASSESSMENT / PLAN:     1. NSTEMI  1. Monitor on telemetry  2. Cardiology to eval  3. Trend troponin  4. Check ECHO, lipids, A1c  5. Continue heparin and nit gtt  6.  Plan for angiogram in AM, if worsening pain or increase in troponin may need ton

## 2021-05-25 ENCOUNTER — ANESTHESIA EVENT (OUTPATIENT)
Dept: CARDIAC SURGERY | Facility: HOSPITAL | Age: 86
DRG: 234 | End: 2021-05-25
Payer: MEDICARE

## 2021-05-25 ENCOUNTER — APPOINTMENT (OUTPATIENT)
Dept: CV DIAGNOSTICS | Facility: HOSPITAL | Age: 86
DRG: 234 | End: 2021-05-25
Attending: INTERNAL MEDICINE
Payer: MEDICARE

## 2021-05-25 ENCOUNTER — APPOINTMENT (OUTPATIENT)
Dept: INTERVENTIONAL RADIOLOGY/VASCULAR | Facility: HOSPITAL | Age: 86
DRG: 234 | End: 2021-05-25
Attending: INTERNAL MEDICINE
Payer: MEDICARE

## 2021-05-25 PROCEDURE — B2111ZZ FLUOROSCOPY OF MULTIPLE CORONARY ARTERIES USING LOW OSMOLAR CONTRAST: ICD-10-PCS | Performed by: INTERNAL MEDICINE

## 2021-05-25 PROCEDURE — 93308 TTE F-UP OR LMTD: CPT | Performed by: INTERNAL MEDICINE

## 2021-05-25 PROCEDURE — 99232 SBSQ HOSP IP/OBS MODERATE 35: CPT | Performed by: STUDENT IN AN ORGANIZED HEALTH CARE EDUCATION/TRAINING PROGRAM

## 2021-05-25 PROCEDURE — B2151ZZ FLUOROSCOPY OF LEFT HEART USING LOW OSMOLAR CONTRAST: ICD-10-PCS | Performed by: INTERNAL MEDICINE

## 2021-05-25 PROCEDURE — 99152 MOD SED SAME PHYS/QHP 5/>YRS: CPT | Performed by: INTERNAL MEDICINE

## 2021-05-25 PROCEDURE — 4A023N7 MEASUREMENT OF CARDIAC SAMPLING AND PRESSURE, LEFT HEART, PERCUTANEOUS APPROACH: ICD-10-PCS | Performed by: INTERNAL MEDICINE

## 2021-05-25 PROCEDURE — 93458 L HRT ARTERY/VENTRICLE ANGIO: CPT | Performed by: INTERNAL MEDICINE

## 2021-05-25 RX ORDER — BISMUTH SUBSALICYLATE 262 MG/1
262 TABLET, CHEWABLE ORAL 4 TIMES DAILY
Status: DISCONTINUED | OUTPATIENT
Start: 2021-05-25 | End: 2021-05-26

## 2021-05-25 RX ORDER — MIDAZOLAM HYDROCHLORIDE 1 MG/ML
INJECTION INTRAMUSCULAR; INTRAVENOUS
Status: COMPLETED
Start: 2021-05-25 | End: 2021-05-25

## 2021-05-25 RX ORDER — PANTOPRAZOLE SODIUM 40 MG/1
40 TABLET, DELAYED RELEASE ORAL
Status: DISCONTINUED | OUTPATIENT
Start: 2021-05-25 | End: 2021-05-25

## 2021-05-25 RX ORDER — PANTOPRAZOLE SODIUM 40 MG/1
40 TABLET, DELAYED RELEASE ORAL
Status: DISCONTINUED | OUTPATIENT
Start: 2021-05-25 | End: 2021-05-26

## 2021-05-25 RX ORDER — HEPARIN SODIUM 5000 [USP'U]/ML
INJECTION, SOLUTION INTRAVENOUS; SUBCUTANEOUS
Status: COMPLETED
Start: 2021-05-25 | End: 2021-05-25

## 2021-05-25 RX ORDER — METRONIDAZOLE 500 MG/1
500 TABLET ORAL 3 TIMES DAILY
Status: DISCONTINUED | OUTPATIENT
Start: 2021-05-25 | End: 2021-05-26

## 2021-05-25 RX ORDER — LIDOCAINE HYDROCHLORIDE 10 MG/ML
INJECTION, SOLUTION EPIDURAL; INFILTRATION; INTRACAUDAL; PERINEURAL
Status: COMPLETED
Start: 2021-05-25 | End: 2021-05-25

## 2021-05-25 RX ORDER — TETRACYCLINE HYDROCHLORIDE 500 MG/1
500 CAPSULE ORAL 4 TIMES DAILY
Status: DISCONTINUED | OUTPATIENT
Start: 2021-05-25 | End: 2021-05-26

## 2021-05-25 RX ORDER — POTASSIUM CHLORIDE 20 MEQ/1
40 TABLET, EXTENDED RELEASE ORAL ONCE
Status: COMPLETED | OUTPATIENT
Start: 2021-05-25 | End: 2021-05-25

## 2021-05-25 NOTE — OPERATIVE REPORT
Full note dictated,    90% ostial Cx  95% mid Cx involving the take off two OM1/PDA    Ventricularization of LM    Small RCA with ostial narrowing    LVEF 50%    Rec: surgical Dano Gant MD

## 2021-05-25 NOTE — PROGRESS NOTES
Pt is AOx4, denies chest pain or dyspnea. Room air. SR/SB on tele. Up with ad in the room. Heparin gtt infusing. Plan for cardiac cath in the am.    2100:  Dr. Keeley Kinney paged at notified that the patient was having 5/10 chest pressure.   1 sublingual ni

## 2021-05-25 NOTE — ANESTHESIA PREPROCEDURE EVALUATION
PRE-OP EVALUATION    Patient Name: Kaye Guerrero    Admit Diagnosis: Chest pain of uncertain etiology [L84.3]  Acute myocardial infarction, unspecified MI type, unspecified artery [facility] [I21.9]    Pre-op Diagnosis: coronary artery disease    CORONARY ARTER [id number]/250mL infusion ED INITIAL DOSE, 12 Units/kg/hr, Intravenous, Once  heparin (PORCINE) drip 42198svjqh/250mL infusion CONTINUOUS, 200-3,000 Units/hr, Intravenous, Continuous  nitroGLYCERIN (NITROSTAT) SL tab 0.4 mg, 0.4 mg, Sublingual, Q5 Min PRN ostial narrowing     LVEF 50%     EKG x2 5/24/2021: subtle ST elevations of less than 1 mm in the inferior lead     CXR 5/24/2021: IMPRESSION: Unremarkable portable chest radiograph.  Dictated by (CST): Drake Wells MD on 5/24/2021 at 11:03 AM      Ec bradycardia    Rhythm: regular  Rate: abnormal     Dental             Pulmonary    Pulmonary exam normal.                 Other findings            ASA: 4   Plan: general  NPO status verified and   Patient has taken beta blockers in last 24 hours.       Com

## 2021-05-25 NOTE — PROGRESS NOTES
Patient seen by Dr. Savannah Marrufo, plan for CABG tomorrow afternoon. Pre-ops in. RN notified.     Noemi Sanchez PA-C   Cardiothoracic Surgery   5/25/2021  4:59 PM

## 2021-05-25 NOTE — PLAN OF CARE
Assumed patient care at 0730 this AM. Patient A&O x4, Unga, left hearing aid. SPO2 maintained on RA, no c/o SOB. NSR/SB with 1st degree AVB on tele. Elevated trops, scheduled for cath this afternoon.  Pt denies CP at this time- maintained on Heparin gtt & Ni goals for specific interventions  Outcome: Progressing     Problem: CARDIOVASCULAR - ADULT  Goal: Maintains optimal cardiac output and hemodynamic stability  Description: INTERVENTIONS:  - Monitor vital signs, rhythm, and trends  - Monitor for bleeding, hy

## 2021-05-25 NOTE — DIETARY NOTE
[facility]   CLINICAL NUTRITION    Akbar Main     Admitting diagnosis:  Chest pain of uncertain etiology [A01.7]  Acute myocardial infarction, unspecified MI type, unspecified artery ([address] [I21.9]    Ht: 167.6 cm (5' 6\")  Wt: 55.5 kg (122 lb 5.7 o

## 2021-05-25 NOTE — PROGRESS NOTES
[facility]  Progress Note     Charo Lowe Patient Status:  Inpatient    3/5/1935 MRN [id number]   [facility] 8NE-A Attending Massimo Balderas MD   Hosp Day # 1 PCP Leonila Ramirez MD     Chief Complaint: Chest pain     S: Patient input(s): PCT in the last 168 hours. Cardiac  Recent Labs   Lab 05/24/21  1227 05/24/21  1748 05/24/21  1934   TROP 0.416* 37.800* 51.600*       Creatinine Kinase  No results for input(s): CK in the last 168 hours.     Inflammatory Markers  No results fo

## 2021-05-26 ENCOUNTER — APPOINTMENT (OUTPATIENT)
Dept: GENERAL RADIOLOGY | Facility: HOSPITAL | Age: 86
DRG: 234 | End: 2021-05-26
Attending: PHYSICIAN ASSISTANT
Payer: MEDICARE

## 2021-05-26 ENCOUNTER — ANESTHESIA (OUTPATIENT)
Dept: CARDIAC SURGERY | Facility: HOSPITAL | Age: 86
DRG: 234 | End: 2021-05-26
Payer: MEDICARE

## 2021-05-26 PROCEDURE — 5A1221Z PERFORMANCE OF CARDIAC OUTPUT, CONTINUOUS: ICD-10-PCS | Performed by: THORACIC SURGERY (CARDIOTHORACIC VASCULAR SURGERY)

## 2021-05-26 PROCEDURE — 06BQ4ZZ EXCISION OF LEFT SAPHENOUS VEIN, PERCUTANEOUS ENDOSCOPIC APPROACH: ICD-10-PCS | Performed by: THORACIC SURGERY (CARDIOTHORACIC VASCULAR SURGERY)

## 2021-05-26 PROCEDURE — 30233N1 TRANSFUSION OF NONAUTOLOGOUS RED BLOOD CELLS INTO PERIPHERAL VEIN, PERCUTANEOUS APPROACH: ICD-10-PCS | Performed by: THORACIC SURGERY (CARDIOTHORACIC VASCULAR SURGERY)

## 2021-05-26 PROCEDURE — 76942 ECHO GUIDE FOR BIOPSY: CPT | Performed by: ANESTHESIOLOGY

## 2021-05-26 PROCEDURE — 30233N0 TRANSFUSION OF AUTOLOGOUS RED BLOOD CELLS INTO PERIPHERAL VEIN, PERCUTANEOUS APPROACH: ICD-10-PCS | Performed by: THORACIC SURGERY (CARDIOTHORACIC VASCULAR SURGERY)

## 2021-05-26 PROCEDURE — 99233 SBSQ HOSP IP/OBS HIGH 50: CPT | Performed by: INTERNAL MEDICINE

## 2021-05-26 PROCEDURE — 02100Z9 BYPASS CORONARY ARTERY, ONE ARTERY FROM LEFT INTERNAL MAMMARY, OPEN APPROACH: ICD-10-PCS | Performed by: THORACIC SURGERY (CARDIOTHORACIC VASCULAR SURGERY)

## 2021-05-26 PROCEDURE — 30233J1 TRANSFUSION OF NONAUTOLOGOUS SERUM ALBUMIN INTO PERIPHERAL VEIN, PERCUTANEOUS APPROACH: ICD-10-PCS | Performed by: THORACIC SURGERY (CARDIOTHORACIC VASCULAR SURGERY)

## 2021-05-26 PROCEDURE — P9045 ALBUMIN (HUMAN), 5%, 250 ML: HCPCS | Performed by: ANESTHESIOLOGY

## 2021-05-26 PROCEDURE — 36430 TRANSFUSION BLD/BLD COMPNT: CPT | Performed by: ANESTHESIOLOGY

## 2021-05-26 PROCEDURE — 99232 SBSQ HOSP IP/OBS MODERATE 35: CPT | Performed by: STUDENT IN AN ORGANIZED HEALTH CARE EDUCATION/TRAINING PROGRAM

## 2021-05-26 PROCEDURE — 30233K1 TRANSFUSION OF NONAUTOLOGOUS FROZEN PLASMA INTO PERIPHERAL VEIN, PERCUTANEOUS APPROACH: ICD-10-PCS | Performed by: THORACIC SURGERY (CARDIOTHORACIC VASCULAR SURGERY)

## 2021-05-26 PROCEDURE — S0028 INJECTION, FAMOTIDINE, 20 MG: HCPCS | Performed by: ANESTHESIOLOGY

## 2021-05-26 PROCEDURE — 30233R1 TRANSFUSION OF NONAUTOLOGOUS PLATELETS INTO PERIPHERAL VEIN, PERCUTANEOUS APPROACH: ICD-10-PCS | Performed by: THORACIC SURGERY (CARDIOTHORACIC VASCULAR SURGERY)

## 2021-05-26 PROCEDURE — 021109W BYPASS CORONARY ARTERY, TWO ARTERIES FROM AORTA WITH AUTOLOGOUS VENOUS TISSUE, OPEN APPROACH: ICD-10-PCS | Performed by: THORACIC SURGERY (CARDIOTHORACIC VASCULAR SURGERY)

## 2021-05-26 PROCEDURE — 71045 X-RAY EXAM CHEST 1 VIEW: CPT | Performed by: PHYSICIAN ASSISTANT

## 2021-05-26 RX ORDER — SODIUM CHLORIDE 9 MG/ML
INJECTION, SOLUTION INTRAVENOUS CONTINUOUS PRN
Status: DISCONTINUED | OUTPATIENT
Start: 2021-05-26 | End: 2021-05-27 | Stop reason: SURG

## 2021-05-26 RX ORDER — PHYTONADIONE 10 MG/ML
INJECTION, EMULSION INTRAMUSCULAR; INTRAVENOUS; SUBCUTANEOUS AS NEEDED
Status: DISCONTINUED | OUTPATIENT
Start: 2021-05-26 | End: 2021-05-27 | Stop reason: SURG

## 2021-05-26 RX ORDER — DOBUTAMINE HYDROCHLORIDE 200 MG/100ML
INJECTION INTRAVENOUS CONTINUOUS PRN
Status: DISCONTINUED | OUTPATIENT
Start: 2021-05-26 | End: 2021-05-27

## 2021-05-26 RX ORDER — MAGNESIUM SULFATE HEPTAHYDRATE 40 MG/ML
2 INJECTION, SOLUTION INTRAVENOUS AS NEEDED
Status: DISCONTINUED | OUTPATIENT
Start: 2021-05-26 | End: 2021-05-28

## 2021-05-26 RX ORDER — HYDROCODONE BITARTRATE AND ACETAMINOPHEN 10; 325 MG/1; MG/1
2 TABLET ORAL EVERY 4 HOURS PRN
Status: DISCONTINUED | OUTPATIENT
Start: 2021-05-26 | End: 2021-06-01

## 2021-05-26 RX ORDER — HYDROCODONE BITARTRATE AND ACETAMINOPHEN 10; 325 MG/1; MG/1
1 TABLET ORAL EVERY 4 HOURS PRN
Status: DISCONTINUED | OUTPATIENT
Start: 2021-05-26 | End: 2021-06-01

## 2021-05-26 RX ORDER — BISACODYL 10 MG
10 SUPPOSITORY, RECTAL RECTAL
Status: DISCONTINUED | OUTPATIENT
Start: 2021-05-26 | End: 2021-06-01

## 2021-05-26 RX ORDER — METHYLPREDNISOLONE SODIUM SUCCINATE 500 MG/1
INJECTION, POWDER, FOR SOLUTION INTRAMUSCULAR; INTRAVENOUS AS NEEDED
Status: DISCONTINUED | OUTPATIENT
Start: 2021-05-26 | End: 2021-05-27 | Stop reason: SURG

## 2021-05-26 RX ORDER — ONDANSETRON 2 MG/ML
4 INJECTION INTRAMUSCULAR; INTRAVENOUS EVERY 6 HOURS PRN
Status: DISCONTINUED | OUTPATIENT
Start: 2021-05-26 | End: 2021-06-01

## 2021-05-26 RX ORDER — DEXMEDETOMIDINE HYDROCHLORIDE 4 UG/ML
INJECTION, SOLUTION INTRAVENOUS CONTINUOUS PRN
Status: DISCONTINUED | OUTPATIENT
Start: 2021-05-26 | End: 2021-05-27 | Stop reason: SURG

## 2021-05-26 RX ORDER — DOBUTAMINE HYDROCHLORIDE 100 MG/100ML
INJECTION INTRAVENOUS CONTINUOUS PRN
Status: DISCONTINUED | OUTPATIENT
Start: 2021-05-26 | End: 2021-05-27 | Stop reason: SURG

## 2021-05-26 RX ORDER — MORPHINE SULFATE 4 MG/ML
4 INJECTION, SOLUTION INTRAMUSCULAR; INTRAVENOUS EVERY 2 HOUR PRN
Status: DISCONTINUED | OUTPATIENT
Start: 2021-05-26 | End: 2021-05-28

## 2021-05-26 RX ORDER — DEXTROSE MONOHYDRATE 25 G/50ML
50 INJECTION, SOLUTION INTRAVENOUS
Status: DISCONTINUED | OUTPATIENT
Start: 2021-05-26 | End: 2021-05-27 | Stop reason: SDUPTHER

## 2021-05-26 RX ORDER — PANTOPRAZOLE SODIUM 40 MG/1
40 TABLET, DELAYED RELEASE ORAL
Status: DISCONTINUED | OUTPATIENT
Start: 2021-05-27 | End: 2021-06-01

## 2021-05-26 RX ORDER — SUFENTANIL CITRATE 50 UG/ML
INJECTION EPIDURAL; INTRAVENOUS AS NEEDED
Status: DISCONTINUED | OUTPATIENT
Start: 2021-05-26 | End: 2021-05-27 | Stop reason: SURG

## 2021-05-26 RX ORDER — ESMOLOL HYDROCHLORIDE 10 MG/ML
INJECTION INTRAVENOUS AS NEEDED
Status: DISCONTINUED | OUTPATIENT
Start: 2021-05-26 | End: 2021-05-27 | Stop reason: SURG

## 2021-05-26 RX ORDER — HEPARIN SODIUM 1000 [USP'U]/ML
INJECTION, SOLUTION INTRAVENOUS; SUBCUTANEOUS AS NEEDED
Status: DISCONTINUED | OUTPATIENT
Start: 2021-05-26 | End: 2021-05-27 | Stop reason: SURG

## 2021-05-26 RX ORDER — CEFAZOLIN SODIUM/WATER 2 G/20 ML
SYRINGE (ML) INTRAVENOUS AS NEEDED
Status: DISCONTINUED | OUTPATIENT
Start: 2021-05-26 | End: 2021-05-27 | Stop reason: SURG

## 2021-05-26 RX ORDER — SODIUM CHLORIDE 9 MG/ML
INJECTION, SOLUTION INTRAVENOUS CONTINUOUS
Status: DISCONTINUED | OUTPATIENT
Start: 2021-05-26 | End: 2021-06-01

## 2021-05-26 RX ORDER — ROCURONIUM BROMIDE 10 MG/ML
INJECTION, SOLUTION INTRAVENOUS AS NEEDED
Status: DISCONTINUED | OUTPATIENT
Start: 2021-05-26 | End: 2021-05-27 | Stop reason: SURG

## 2021-05-26 RX ORDER — ALBUMIN, HUMAN INJ 5% 5 %
250 SOLUTION INTRAVENOUS ONCE AS NEEDED
Status: COMPLETED | OUTPATIENT
Start: 2021-05-26 | End: 2021-05-26

## 2021-05-26 RX ORDER — DEXTROSE AND SODIUM CHLORIDE 5; .45 G/100ML; G/100ML
INJECTION, SOLUTION INTRAVENOUS CONTINUOUS
Status: DISCONTINUED | OUTPATIENT
Start: 2021-05-26 | End: 2021-05-28

## 2021-05-26 RX ORDER — MELATONIN
3 NIGHTLY PRN
Status: DISCONTINUED | OUTPATIENT
Start: 2021-05-26 | End: 2021-06-01

## 2021-05-26 RX ORDER — DOCUSATE SODIUM 100 MG/1
100 CAPSULE, LIQUID FILLED ORAL 2 TIMES DAILY
Status: DISCONTINUED | OUTPATIENT
Start: 2021-05-26 | End: 2021-06-01

## 2021-05-26 RX ORDER — POTASSIUM CHLORIDE 29.8 MG/ML
40 INJECTION INTRAVENOUS AS NEEDED
Status: DISCONTINUED | OUTPATIENT
Start: 2021-05-26 | End: 2021-05-28

## 2021-05-26 RX ORDER — ALBUMIN, HUMAN INJ 5% 5 %
SOLUTION INTRAVENOUS CONTINUOUS PRN
Status: DISCONTINUED | OUTPATIENT
Start: 2021-05-26 | End: 2021-05-27 | Stop reason: SURG

## 2021-05-26 RX ORDER — POTASSIUM CHLORIDE 14.9 MG/ML
20 INJECTION INTRAVENOUS AS NEEDED
Status: DISCONTINUED | OUTPATIENT
Start: 2021-05-26 | End: 2021-05-28

## 2021-05-26 RX ORDER — POLYETHYLENE GLYCOL 3350 17 G/17G
17 POWDER, FOR SOLUTION ORAL DAILY PRN
Status: DISCONTINUED | OUTPATIENT
Start: 2021-05-26 | End: 2021-06-01

## 2021-05-26 RX ORDER — MORPHINE SULFATE 4 MG/ML
6 INJECTION, SOLUTION INTRAMUSCULAR; INTRAVENOUS EVERY 2 HOUR PRN
Status: DISCONTINUED | OUTPATIENT
Start: 2021-05-26 | End: 2021-05-28

## 2021-05-26 RX ORDER — HYDRALAZINE HYDROCHLORIDE 20 MG/ML
10 INJECTION INTRAMUSCULAR; INTRAVENOUS
Status: DISCONTINUED | OUTPATIENT
Start: 2021-05-26 | End: 2021-06-01

## 2021-05-26 RX ORDER — MORPHINE SULFATE 2 MG/ML
2 INJECTION, SOLUTION INTRAMUSCULAR; INTRAVENOUS EVERY 2 HOUR PRN
Status: DISCONTINUED | OUTPATIENT
Start: 2021-05-26 | End: 2021-05-28

## 2021-05-26 RX ORDER — SODIUM PHOSPHATE, DIBASIC AND SODIUM PHOSPHATE, MONOBASIC 7; 19 G/133ML; G/133ML
1 ENEMA RECTAL ONCE AS NEEDED
Status: DISCONTINUED | OUTPATIENT
Start: 2021-05-26 | End: 2021-06-01

## 2021-05-26 RX ORDER — MAGNESIUM SULFATE 1 G/100ML
1 INJECTION INTRAVENOUS AS NEEDED
Status: DISCONTINUED | OUTPATIENT
Start: 2021-05-26 | End: 2021-05-28

## 2021-05-26 RX ORDER — ASPIRIN 325 MG
325 TABLET, DELAYED RELEASE (ENTERIC COATED) ORAL DAILY
Status: DISCONTINUED | OUTPATIENT
Start: 2021-05-27 | End: 2021-05-28

## 2021-05-26 RX ORDER — PHENYLEPHRINE HCL 10 MG/ML
VIAL (ML) INJECTION AS NEEDED
Status: DISCONTINUED | OUTPATIENT
Start: 2021-05-26 | End: 2021-05-27 | Stop reason: SURG

## 2021-05-26 RX ORDER — FAMOTIDINE 10 MG/ML
INJECTION, SOLUTION INTRAVENOUS AS NEEDED
Status: DISCONTINUED | OUTPATIENT
Start: 2021-05-26 | End: 2021-05-27 | Stop reason: SURG

## 2021-05-26 RX ORDER — DEXMEDETOMIDINE HYDROCHLORIDE 4 UG/ML
INJECTION, SOLUTION INTRAVENOUS CONTINUOUS
Status: DISCONTINUED | OUTPATIENT
Start: 2021-05-26 | End: 2021-05-27

## 2021-05-26 RX ORDER — ASPIRIN 300 MG
300 SUPPOSITORY, RECTAL RECTAL DAILY
Status: DISCONTINUED | OUTPATIENT
Start: 2021-05-27 | End: 2021-05-28

## 2021-05-26 RX ORDER — CEFAZOLIN SODIUM/WATER 2 G/20 ML
2 SYRINGE (ML) INTRAVENOUS EVERY 8 HOURS
Status: COMPLETED | OUTPATIENT
Start: 2021-05-26 | End: 2021-05-28

## 2021-05-26 RX ORDER — PROTAMINE SULFATE 10 MG/ML
INJECTION, SOLUTION INTRAVENOUS AS NEEDED
Status: DISCONTINUED | OUTPATIENT
Start: 2021-05-26 | End: 2021-05-27 | Stop reason: SURG

## 2021-05-26 RX ORDER — DIPHENHYDRAMINE HYDROCHLORIDE 50 MG/ML
INJECTION INTRAMUSCULAR; INTRAVENOUS AS NEEDED
Status: DISCONTINUED | OUTPATIENT
Start: 2021-05-26 | End: 2021-05-27 | Stop reason: SURG

## 2021-05-26 RX ORDER — NITROGLYCERIN 20 MG/100ML
INJECTION INTRAVENOUS CONTINUOUS PRN
Status: DISCONTINUED | OUTPATIENT
Start: 2021-05-26 | End: 2021-05-27

## 2021-05-26 RX ORDER — MIDAZOLAM HYDROCHLORIDE 1 MG/ML
INJECTION INTRAMUSCULAR; INTRAVENOUS AS NEEDED
Status: DISCONTINUED | OUTPATIENT
Start: 2021-05-26 | End: 2021-05-27 | Stop reason: SURG

## 2021-05-26 RX ADMIN — DIPHENHYDRAMINE HYDROCHLORIDE 50 MG: 50 INJECTION INTRAMUSCULAR; INTRAVENOUS at 11:17:00

## 2021-05-26 RX ADMIN — SODIUM CHLORIDE: 9 INJECTION, SOLUTION INTRAVENOUS at 11:15:00

## 2021-05-26 RX ADMIN — SUFENTANIL CITRATE 25 MCG: 50 INJECTION EPIDURAL; INTRAVENOUS at 11:24:00

## 2021-05-26 RX ADMIN — ESMOLOL HYDROCHLORIDE 25 MG: 10 INJECTION INTRAVENOUS at 11:20:00

## 2021-05-26 RX ADMIN — MIDAZOLAM HYDROCHLORIDE 5 MG: 1 INJECTION INTRAMUSCULAR; INTRAVENOUS at 16:05:00

## 2021-05-26 RX ADMIN — DOBUTAMINE HYDROCHLORIDE 5 MCG/KG/MIN: 100 INJECTION INTRAVENOUS at 14:57:00

## 2021-05-26 RX ADMIN — MIDAZOLAM HYDROCHLORIDE 8 MG: 1 INJECTION INTRAMUSCULAR; INTRAVENOUS at 11:24:00

## 2021-05-26 RX ADMIN — ROCURONIUM BROMIDE 50 MG: 10 INJECTION, SOLUTION INTRAVENOUS at 15:00:00

## 2021-05-26 RX ADMIN — MIDAZOLAM HYDROCHLORIDE 2 MG: 1 INJECTION INTRAMUSCULAR; INTRAVENOUS at 11:17:00

## 2021-05-26 RX ADMIN — MIDAZOLAM HYDROCHLORIDE 5 MG: 1 INJECTION INTRAMUSCULAR; INTRAVENOUS at 13:10:00

## 2021-05-26 RX ADMIN — ESMOLOL HYDROCHLORIDE 25 MG: 10 INJECTION INTRAVENOUS at 11:29:00

## 2021-05-26 RX ADMIN — CEFAZOLIN SODIUM/WATER 2 G: 2 G/20 ML SYRINGE (ML) INTRAVENOUS at 11:30:00

## 2021-05-26 RX ADMIN — SODIUM CHLORIDE: 9 INJECTION, SOLUTION INTRAVENOUS at 17:22:00

## 2021-05-26 RX ADMIN — HEPARIN SODIUM 5000 UNITS: 1000 INJECTION, SOLUTION INTRAVENOUS; SUBCUTANEOUS at 12:56:00

## 2021-05-26 RX ADMIN — PHYTONADIONE 10 MG: 10 INJECTION, EMULSION INTRAMUSCULAR; INTRAVENOUS; SUBCUTANEOUS at 15:10:00

## 2021-05-26 RX ADMIN — FAMOTIDINE 20 MG: 10 INJECTION, SOLUTION INTRAVENOUS at 11:17:00

## 2021-05-26 RX ADMIN — ALBUMIN, HUMAN INJ 5%: 5 SOLUTION INTRAVENOUS at 16:20:00

## 2021-05-26 RX ADMIN — ROCURONIUM BROMIDE 50 MG: 10 INJECTION, SOLUTION INTRAVENOUS at 11:24:00

## 2021-05-26 RX ADMIN — DEXMEDETOMIDINE HYDROCHLORIDE 0.5 MCG/KG/HR: 4 INJECTION, SOLUTION INTRAVENOUS at 15:50:00

## 2021-05-26 RX ADMIN — PROTAMINE SULFATE 250 MG: 10 INJECTION, SOLUTION INTRAVENOUS at 15:00:00

## 2021-05-26 RX ADMIN — ROCURONIUM BROMIDE 50 MG: 10 INJECTION, SOLUTION INTRAVENOUS at 12:15:00

## 2021-05-26 RX ADMIN — HEPARIN SODIUM 11000 UNITS: 1000 INJECTION, SOLUTION INTRAVENOUS; SUBCUTANEOUS at 13:06:00

## 2021-05-26 RX ADMIN — ALBUMIN, HUMAN INJ 5%: 5 SOLUTION INTRAVENOUS at 16:00:00

## 2021-05-26 RX ADMIN — PHENYLEPHRINE HCL 100 MCG: 10 MG/ML VIAL (ML) INJECTION at 11:50:00

## 2021-05-26 RX ADMIN — CEFAZOLIN SODIUM/WATER 2 G: 2 G/20 ML SYRINGE (ML) INTRAVENOUS at 15:35:00

## 2021-05-26 RX ADMIN — PHENYLEPHRINE HCL 100 MCG: 10 MG/ML VIAL (ML) INJECTION at 12:02:00

## 2021-05-26 RX ADMIN — METHYLPREDNISOLONE SODIUM SUCCINATE 500 MG: 500 INJECTION, POWDER, FOR SOLUTION INTRAMUSCULAR; INTRAVENOUS at 12:15:00

## 2021-05-26 NOTE — ANESTHESIA PROCEDURE NOTES
Arterial Line  Performed by: Gilbert Blackmon MD  Authorized by: Gilbert Blackmon MD     General Information and Staff    Procedure Start:   Procedure End: 5/26/2021 11:23 AM  Anesthesiologist:  Gilbert Blackmon MD  Performed By:  Kaye Leos

## 2021-05-26 NOTE — ANESTHESIA PROCEDURE NOTES
Central Line  Performed by: Carroll Potter MD  Authorized by: Carroll Potter MD     General Information and Staff    Procedure Start:   Procedure End: 5/26/2021 11:55 AM  Anesthesiologist:  Carroll Potter MD  Performed by:   Anesthesiologi

## 2021-05-26 NOTE — ANESTHESIA POSTPROCEDURE EVALUATION
Fritz Worthy 1 Patient Status:  Inpatient   Age/Gender 82year old male MRN [id number]   [facility] 6NE-A Attending Edgar Barrios MD   [address] Day # 2 PCP Chelita Frost MD       Anesthesia Post-op Note    CORONARY ARTERY BYP

## 2021-05-26 NOTE — PLAN OF CARE
Second case CABG today,npo after mn  Consent signed,  Up awake,alert   Denies chest pain,no sob  Room air  Heparin gtt 600 units at 6cc/hr stopped o/c to OR   Nitroglycerin gtt at 5mcg/min at 1.5cc/hr  Son at bedside  11 am to OR accompanied by OR staff

## 2021-05-26 NOTE — PLAN OF CARE
Pt is A&Ox4, RA, lungs clear. NSR on tele, denies cardiovascular symptoms. R groin soft, no complications. CHG shower completed, npo at midnight. 2nd case CABG tomorrow 5/26. Heparin gtt off on call to OR per order. Nitrog gtt infusing 5mcg/min.  Up with SB questions as needed    - See additional Care Plan goals for specific interventions  Outcome: Progressing

## 2021-05-26 NOTE — PROCEDURES
[facility]    PATIENT'S NAME: KARIN Yoon   ATTENDING PHYSICIAN: Karen Figueroa MD   OPERATING PHYSICIAN: Scott Catalan M.D.    PATIENT ACCOUNT#:   [de-identified]    LOCATION:  [address] [place] [address]  MEDICAL RECORD #:   [id number]       DATE OF BIRTH: anterior descending artery in its proximal and midsection does not have significant disease. There are minor luminal irregularities. No significant disease is identified. The first 2 diagonal branches are larger.   There is ostial stenosis of about 50% t M.D.  d: 05/25/2021 13:43:29  t: 05/25/2021 17:52:48  [facility] [id number]  /

## 2021-05-26 NOTE — ANESTHESIA PROCEDURE NOTES
Airway  Date/Time: 5/26/2021 11:25 AM  Urgency: elective      General Information and Staff    Patient location during procedure: OR  Anesthesiologist: Stephen Sanchez MD  Performed: anesthesiologist     Indications and Patient Condition  Indications

## 2021-05-26 NOTE — PROGRESS NOTES
[facility]  Progress Note     Phani Ross Patient Status:  Inpatient    3/5/1935 MRN [id number]   [facility] 8NE-A Attending April Avelar MD   Hosp Day # 2 PCP Chucky Wilkins MD     Chief Complaint: Chest pain     S: Patient a Detected 05/24/2021    COVID19 Not Detected 05/04/2021    COVID19 Detected (A) 01/18/2021       Pro-Calcitonin  No results for input(s): PCT in the last 168 hours. Cardiac  Recent Labs   Lab 05/24/21  1227 05/24/21  1748 05/24/21  1934   TROP 0.416* 37.

## 2021-05-27 ENCOUNTER — APPOINTMENT (OUTPATIENT)
Dept: GENERAL RADIOLOGY | Facility: HOSPITAL | Age: 86
DRG: 234 | End: 2021-05-27
Attending: THORACIC SURGERY (CARDIOTHORACIC VASCULAR SURGERY)
Payer: MEDICARE

## 2021-05-27 ENCOUNTER — APPOINTMENT (OUTPATIENT)
Dept: GENERAL RADIOLOGY | Facility: HOSPITAL | Age: 86
DRG: 234 | End: 2021-05-27
Attending: PHYSICIAN ASSISTANT
Payer: MEDICARE

## 2021-05-27 PROCEDURE — 71045 X-RAY EXAM CHEST 1 VIEW: CPT | Performed by: THORACIC SURGERY (CARDIOTHORACIC VASCULAR SURGERY)

## 2021-05-27 PROCEDURE — 99233 SBSQ HOSP IP/OBS HIGH 50: CPT | Performed by: INTERNAL MEDICINE

## 2021-05-27 PROCEDURE — 99233 SBSQ HOSP IP/OBS HIGH 50: CPT | Performed by: STUDENT IN AN ORGANIZED HEALTH CARE EDUCATION/TRAINING PROGRAM

## 2021-05-27 PROCEDURE — 71045 X-RAY EXAM CHEST 1 VIEW: CPT | Performed by: PHYSICIAN ASSISTANT

## 2021-05-27 RX ORDER — DEXTROSE MONOHYDRATE 25 G/50ML
50 INJECTION, SOLUTION INTRAVENOUS
Status: DISCONTINUED | OUTPATIENT
Start: 2021-05-27 | End: 2021-06-01

## 2021-05-27 RX ORDER — ALBUMIN, HUMAN INJ 5% 5 %
500 SOLUTION INTRAVENOUS ONCE
Status: COMPLETED | OUTPATIENT
Start: 2021-05-27 | End: 2021-05-27

## 2021-05-27 NOTE — PROGRESS NOTES
[facility]  Progress Note     Prince Barragan Patient Status:  Inpatient    3/5/1935 MRN [id number]   [facility] 8NE-A Attending Becky Hilton MD   Hosp Day # 3 PCP Beata Guzman MD     Chief Complaint: Chest pain     S: Patient 0. 84   GFRAA 68   < > 93  --   --  88 93   GFRNAA 59*   < > 80  --   --  76 80   CA 8.6   < > 8.4*  --   --  8.3* 8.4*   ALB 3.6  --   --   --   --   --   --       < > 140  --   --  146* 143   K 3.7   < > 3.7   < > 3.9 3.1* 3.8      < > 109  -- following  4. deline per CTS  2. H pylori  Resume po meds in am   3. HTN  Per cards    4. DM T2 A1c 7.2  On insulin coverage- convert to ISS   5.      Plan of care:   CABG s/p  then transfer to CNICU   Monitor BS A1c elevated add ISS ,   Add IS   Monitor me

## 2021-05-27 NOTE — PROGRESS NOTES
05/27/21 0343   Vent Information   $ RT Standby Charge (per 15 min) 1   Ventilator Initiation 05/26/21   Ventilation Day(s) 2   Interface Invasive   Vent Type    Vent -11   Vent Mode VC+   Settings   FiO2 (%) 40 %   Resp Rate (Set) 12   Vt (S

## 2021-05-27 NOTE — DIETARY NOTE
[facility]   CLINICAL NUTRITION    Tommy Magallon     Admitting diagnosis:  Chest pain of uncertain etiology [H45.2]  Acute myocardial infarction, unspecified MI type, unspecified artery ([address] [I21.9]    Ht: 167.6 cm (5' 6\")  Wt: 60.2 kg (132 lb 11.5

## 2021-05-27 NOTE — PROGRESS NOTES
[facility]  Progress Note    Paras Kovacs Patient Status:  Inpatient    3/5/1935 MRN [id number]   [facility] 6NE-A Attending Augie Johnson MD   Hosp Day # 3 PCP Curt Warren MD     Subjective:  Pt extubated this morning.  He has n POD #1   - HD stable, requiring pacing at times - hold BBradha today   - Vol OK   - Cr normal   - Neuro Intact   - deline   - CCU monitoring      Melanie Thornton  5/27/2021  10:10 AM

## 2021-05-27 NOTE — OCCUPATIONAL THERAPY NOTE
Attempted to see pt this PM. Per RN, pt's pressures are low. OT will continue to monitor and follow-up at a later time when pt's pressures are stable.

## 2021-05-27 NOTE — PHYSICAL THERAPY NOTE
Physical Therapy    Attempted to eval pt this a.m., but had just been extubated and waiting for pt to be delined. Re-attempted in p.m., but pt's bp remains tenuous w/ rn plan to dangle only at this time. Will re-attempt eval tomorrow.

## 2021-05-27 NOTE — PROGRESS NOTES
[facility]  Cardiology Progress Note    Alexey Velasquez Patient Status:  Inpatient    3/5/1935 MRN [id number]   [facility] 6NE-A Attending Adelia Vora MD   Hosp Day # 3 PCP Geronimo Braden MD       Subjective:  Currently quite comfor Application Nasal BID   • pantoprazole (PROTONIX) IV push  40 mg Intravenous QAM AC    Or   • Pantoprazole Sodium  40 mg Oral QAM AC   • ceFAZolin  2 g Intravenous Q8H   • atorvastatin  80 mg Oral Nightly   • metoprolol tartrate  12.5 mg Oral 2x Daily(Beta

## 2021-05-27 NOTE — PROGRESS NOTES
Received patient at 17:15 from cardiac operating room intubated and sedated. Dobutamine at 5 mcg/kg/min, NTG at 20 mcg/min,  precedex at 0.5 mcg/kg/min, insulin per protocol. CCO filament error, Dr. Tyler Marcelo reposition swan xray done.   Nipride started t

## 2021-05-27 NOTE — OPERATIVE REPORT
[facility]    PATIENT'S NAME: KARIN Varma   ATTENDING PHYSICIAN: Adriana Burton MD   OPERATING PHYSICIAN: Riddhi Prince MD   PATIENT ACCOUNT#:   [id number]    LOCATION:  [address] [place] [address]  MEDICAL RECORD #:   [id number]       DATE OF BIRTH:  [birth year] electromechanical arrest of the heart. The right coronary was a nondominant artery not needing of bypass. The circumflex system was inspected, where there was basically a trifurcated-type arrangement of his obtuse marginals.   This was the area of the inf

## 2021-05-27 NOTE — PLAN OF CARE
Assumed patient care at [address] [place] [address]. Patient intubated and sedated on precedex. Restraints in place for safety. Sinus rhythm 60-70's with PVCs. SBP goal . Treating tod pressure, cuff pressure 10-20 points lower than tod. Dobutamine weaned as able.  Rosales Youssef

## 2021-05-27 NOTE — PLAN OF CARE
Assumed care at 0730. Pt alert and oriented x4, ELDRIDGE, standing at bedside with x1 assist. VSS, NSR/SB on tele. Metoprolol held per order from Cardiology. Pt hypotensive with SBP in 80's, Dr Richar Wyatt notified, 500cc albumin given, pt now normotensive.  Pt de-lined

## 2021-05-28 PROCEDURE — 99233 SBSQ HOSP IP/OBS HIGH 50: CPT | Performed by: INTERNAL MEDICINE

## 2021-05-28 RX ORDER — FUROSEMIDE 10 MG/ML
40 INJECTION INTRAMUSCULAR; INTRAVENOUS ONCE
Status: COMPLETED | OUTPATIENT
Start: 2021-05-29 | End: 2021-05-28

## 2021-05-28 RX ORDER — HYDROCODONE BITARTRATE AND ACETAMINOPHEN 5; 325 MG/1; MG/1
1-2 TABLET ORAL EVERY 6 HOURS PRN
Qty: 50 TABLET | Refills: 0 | Status: SHIPPED | OUTPATIENT
Start: 2021-05-28 | End: 2021-08-12

## 2021-05-28 RX ORDER — METRONIDAZOLE 500 MG/1
500 TABLET ORAL EVERY 8 HOURS SCHEDULED
Status: DISCONTINUED | OUTPATIENT
Start: 2021-05-28 | End: 2021-06-01

## 2021-05-28 RX ORDER — TETRACYCLINE HYDROCHLORIDE 500 MG/1
500 CAPSULE ORAL 4 TIMES DAILY
Status: DISCONTINUED | OUTPATIENT
Start: 2021-05-28 | End: 2021-06-01

## 2021-05-28 RX ORDER — ASPIRIN 81 MG/1
81 TABLET ORAL DAILY
Status: DISCONTINUED | OUTPATIENT
Start: 2021-05-29 | End: 2021-06-01

## 2021-05-28 RX ORDER — ASPIRIN 300 MG
150 SUPPOSITORY, RECTAL RECTAL DAILY
Status: DISCONTINUED | OUTPATIENT
Start: 2021-05-29 | End: 2021-06-01

## 2021-05-28 RX ORDER — CLOPIDOGREL BISULFATE 75 MG/1
75 TABLET ORAL DAILY
Status: DISCONTINUED | OUTPATIENT
Start: 2021-05-28 | End: 2021-06-01

## 2021-05-28 RX ORDER — BISMUTH SUBSALICYLATE 262 MG/1
1 TABLET, CHEWABLE ORAL 4 TIMES DAILY
Status: DISCONTINUED | OUTPATIENT
Start: 2021-05-28 | End: 2021-06-01

## 2021-05-28 NOTE — PROGRESS NOTES
[facility]     CV Surgery Progress Note    Emmie Wu Patient Status:  Inpatient    3/5/1935 MRN [id number]   [facility] 6NE-A Attending Summer Carl MD   Hosp Day # 4 PCP Shaista Noel MD     Subjective:  Patient just walked w reactive, afebrile- trend CBC  -Acute post op blood loss anemia/tcp, expected, stable- monitor   -Volume OL, weight up from baseline, will discuss lasix given SBP improved  -Renal function intact, marginal UOP, gentle IVF if needed- monitor   -Start plavix

## 2021-05-28 NOTE — HOME CARE LIAISON
Patient provided with list of [facility] providers from [facility], patient choice is [facility]. Agency reserved in [facility] and contact information placed on AVS.   Financial interest disclosure provided to patient.

## 2021-05-28 NOTE — OCCUPATIONAL THERAPY NOTE
OCCUPATIONAL THERAPY EVALUATION - INPATIENT     Room Number: 2611/2137-M  Evaluation Date: 5/28/2021  Type of Evaluation: Initial  Presenting Problem: chest pain, MI, 5/26 CABG    Physician Order: IP Consult to Occupational Therapy  Reason for Therapy: ADL SATURATIONS  Oxygen Therapy  SPO2% on Room Air at Rest: [address] ‘6-Clicks’ Inpatient Daily Activity Short Form  How much help from another person does the patient currently need…  -   Putting on and taking off re functioning below his previous functional level and would benefit from skilled inpatient OT to address the above deficits, maximizing patient’s ability to return safely to his prior level of function.     Patient Complexity  Occupational Profile/Medical His

## 2021-05-28 NOTE — PROGRESS NOTES
[facility]  Cardiology Progress Note    Cinthya Tipton Patient Status:  Inpatient    3/5/1935 MRN [id number]   [facility] 6NE-A Attending Hannah Harrington MD   Hosp Day # 4 PCP Kassy Franklin MD       Subjective:  Doing well.  Walked mallory Daily    Or   • aspirin  300 mg Rectal Daily   • mupirocin  1 Application Nasal BID   • pantoprazole (PROTONIX) IV push  40 mg Intravenous QAM AC    Or   • Pantoprazole Sodium  40 mg Oral QAM AC   • atorvastatin  80 mg Oral Nightly   • metoprolol tartrate

## 2021-05-28 NOTE — PLAN OF CARE
Assumed patient care at [facility]. Patient alert and oriented. Denies pain. Sinus rhythm 60-70's SBP 's. Voids per urinal. V pacing wires in back up @50. Low UO overnight. Plan discussed and questions answered. Will continue to monitor.     Dr. Caban  updated

## 2021-05-28 NOTE — PLAN OF CARE
Pt received from CCU, placed on tele, oriented to the unit. Pt A&O x 4, SPO2 96% on RA, sinus rhythm on tele, up with standby assist with walker. Mid-sternal incision with steri-strips. Encouraged deep breathing and use of IS.  Ambulated in the hallway, deion pain    Interventions:   - communicate with RN regarding pain   -attend all tests ordered  -ask questions as needed    - See additional Care Plan goals for specific interventions  Outcome: Progressing     Problem: PAIN - ADULT  Goal: Verbalizes/displays ad handout, if applicable  - Encourage broncho-pulmonary hygiene including cough, deep breathe, Incentive Spirometry  - Assess the need for suctioning and perform as needed  - Assess and instruct to report SOB or any respiratory difficulty  - Respiratory Ther Monitor urine specific gravity, serum osmolarity and serum sodium as indicated or ordered  - Monitor response to interventions for patient's volume status, including labs, urine output, blood pressure (other measures as available)  - Encourage oral intake intramuscular injections, enemas and rectal medication administration  - Ensure safe mobilization of patient  - Hold pressure on venipuncture sites to achieve adequate hemostasis  - Assess for signs and symptoms of internal bleeding  - Monitor lab trends

## 2021-05-28 NOTE — CM/SW NOTE
05/28/21 1500   CM/SW Referral Data   Referral Source    Reason for Referral Discharge planning   Informant Patient; Children   Patient Info   Patient's Mental Status Alert;Oriented   Patient's Home Environment House   Patient lives with Chil

## 2021-05-28 NOTE — PHYSICAL THERAPY NOTE
PHYSICAL THERAPY EVALUATION - INPATIENT     Room Number: 2665/9247-R  Evaluation Date: 5/28/2021  Type of Evaluation: Initial  Physician Order: PT Eval and Treat    Presenting Problem: NSTEMI, s/p CABG x3 5/26/21  Reason for Therapy: Mobility Dysfunc extremity ROM and strength -see OT evaluation    Lower extremity ROM is within functional limits     Lower extremity strength is within functional limits - grossly 4-/5 throughout    BALANCE  Static Sitting: Fair  Dynamic Sitting: Fair -  Static Standing: recliner. Exercise/Education Provided:  Bed mobility  Functional activity tolerated  Gait training  Transfer training    Patient End of Session: Up in chair;Needs met;Call light within reach;RN aware of session/findings; All patient questions and alexandria Goal #5    Goal #6    Goal Comments: Goals established on 5/28/2021

## 2021-05-28 NOTE — PROGRESS NOTES
Skin assessment completed with another RN \"Cheri\". No redness noted over nasal bridge, ears, occipital region, scapular region, bilateral elbows, sacrum/coccyx, buttocks, bilateral hips, bilateral ankles or heels.

## 2021-05-28 NOTE — PLAN OF CARE
Assumed care at 0730. Pt alert and oriented x4, ELDRIDGE, ambulating hallways with standby assist. VSS, NSR on tele. Pacing wires removed per order, pt tolerated well. Pt son at bedside, all questions answered. Pt to be transferred to room 8611.

## 2021-05-28 NOTE — PROGRESS NOTES
[facility]  Progress Note     Lilia Leventhal Patient Status:  Inpatient    3/5/1935 MRN [id number]   [facility] 8NE-A Attending Jackie Herrera MD   Hosp Day # 4 PCP Jed Mcbride MD     Chief Complaint: Chest pain     S: Patient 1.14   < > 0.92 0.84 1.19   GFRAA 68   < > 88 93 64   GFRNAA 59*   < > 76 80 56*   CA 8.6   < > 8.3* 8.4* 8.7   ALB 3.6  --   --   --   --       < > 146* 143 139   K 3.7   < > 3.1* 3.8 4.1      < > 113* 115* 108   CO2 23.0   < > 26.0 25.0 23.0 2. H pylori  Will Resume po meds today      3. HTN  Per cards      4. DM T2 A1c 7.2  On insulin coverage- convert to ISS  Likely low dose metformin    5.      Plan of care:   plavix resumed   plts increasing   UO on lower side  Cr sl higher close monitori

## 2021-05-29 PROCEDURE — 99232 SBSQ HOSP IP/OBS MODERATE 35: CPT | Performed by: NURSE PRACTITIONER

## 2021-05-29 PROCEDURE — 99232 SBSQ HOSP IP/OBS MODERATE 35: CPT | Performed by: STUDENT IN AN ORGANIZED HEALTH CARE EDUCATION/TRAINING PROGRAM

## 2021-05-29 RX ORDER — POTASSIUM CHLORIDE 20 MEQ/1
40 TABLET, EXTENDED RELEASE ORAL EVERY 4 HOURS
Status: COMPLETED | OUTPATIENT
Start: 2021-05-29 | End: 2021-05-29

## 2021-05-29 NOTE — PROGRESS NOTES
[facility]   CVS Progress Note    Elida Hedrick Patient Status:  Inpatient    3/5/1935 MRN [id number]   [facility] 8NE-A Attending Cristian Tim MD   Hosp Day # 5 PCP Fran Pérez MD     Subjective:  Pt up to chair, tolerating b NaCl infusion, , Intravenous, Continuous  HYDROcodone-acetaminophen (NORCO)  MG per tab 1 tablet, 1 tablet, Oral, Q4H PRN   Or  HYDROcodone-acetaminophen (NORCO)  MG per tab 2 tablet, 2 tablet, Oral, Q4H PRN  melatonin tab 3 mg, 3 mg, Oral, Nig uncertain etiology     Acute myocardial infarction, unspecified MI type, unspecified artery ([facility] [address]      POD# 3 s/p CABG  - Pt is HD stable  - NSR: transient junctional rhythm - resolved, low dose BB resumed last night  - Plavix resumed  - Encourage IS/Ambul

## 2021-05-29 NOTE — PROGRESS NOTES
[facility]  Progress Note     Jonathon Johnson Patient Status:  Inpatient    3/5/1935 MRN [id number]   [facility] 8NE-A Attending Laurita Lim MD   Hosp Day # 5 PCP Ivan Fiore MD     Chief Complaint: Chest pain     S: Patient d 139 140   K 3.7   < > 3.8 4.1 3.3*      < > 115* 108 106   CO2 23.0   < > 25.0 23.0 30.0   ALKPHO 50  --   --   --   --    AST 24  --   --   --   --    ALT 23  --   --   --   --    BILT 0.4  --   --   --   --    TP 6.9  --   --   --   --     < > = va no  · If COVID testing is negative, may discontinue isolation: yes      Will the patient be referred to [facility] on discharge?: no  Estimated date of discharge: tbd  Discharge is dependent on: clinical   At this point Mr. Ike Jones is expected to be discharge to:

## 2021-05-29 NOTE — PLAN OF CARE
Assumed care of patient around [address]. Alert and oriented x4. Denies any chest pain, lightheadedness or shortness of breath. On room air. NSR on tele. Voids. Continent of bowel and bladder. Up with SBA.  Midsternal and left leg donor site incisions treated wi as ordered  - Initiate emergency measures for life threatening arrhythmias  - Monitor electrolytes and administer replacement therapy as ordered  Outcome: Progressing     Problem: PAIN - ADULT  Goal: Verbalizes/displays adequate comfort level or patient's broncho-pulmonary hygiene including cough, deep breathe, Incentive Spirometry  - Assess the need for suctioning and perform as needed  - Assess and instruct to report SOB or any respiratory difficulty  - Respiratory Therapy support as indicated  - Manage/a

## 2021-05-29 NOTE — PROGRESS NOTES
[facility]  Cardiology Progress Note    Cinthya Tipton Patient Status:  Inpatient    3/5/1935 MRN [id number]   [facility] 6NE-A Attending Hannah Harrington MD   Hosp Day # 5 PCP Kassy Franklin MD       Subjective:  Continues to do well. pantoprazole (PROTONIX) IV push  40 mg Intravenous QAM AC    Or   • Pantoprazole Sodium  40 mg Oral QAM AC   • atorvastatin  80 mg Oral Nightly   • metoprolol tartrate  12.5 mg Oral 2x Daily(Beta Blocker)     • sodium chloride 30 mL/hr at 05/27/21 0400

## 2021-05-29 NOTE — PLAN OF CARE
Received patient at 0730. Alert and Oriented x4. Tele Rhythm NSR. O2 saturation 96% On room air. Breath sounds diminished but clear. Bed is locked and in low position. Call light and personal items within reach.  Norco given this am for mild incisional pain Patient/Family Short Term Goal  Description: Patient's Short Term Goal: no chest pain    Interventions:   - communicate with RN regarding pain   -attend all tests ordered  -ask questions as needed    - See additional Care Plan goals for specific interventi supplementation based on oxygen saturation or ABGs  - Provide Smoking Cessation handout, if applicable  - Encourage broncho-pulmonary hygiene including cough, deep breathe, Incentive Spirometry  - Assess the need for suctioning and perform as needed  - Ass symptoms of volume excess or deficit  - Monitor intake, output and patient weight  - Monitor urine specific gravity, serum osmolarity and serum sodium as indicated or ordered  - Monitor response to interventions for patient's volume status, including labs, injury  Description: (Example usage: patient with low platelets)  INTERVENTIONS:  - Avoid intramuscular injections, enemas and rectal medication administration  - Ensure safe mobilization of patient  - Hold pressure on venipuncture sites to achieve adequat

## 2021-05-30 PROCEDURE — 99232 SBSQ HOSP IP/OBS MODERATE 35: CPT | Performed by: INTERNAL MEDICINE

## 2021-05-30 PROCEDURE — 99232 SBSQ HOSP IP/OBS MODERATE 35: CPT | Performed by: STUDENT IN AN ORGANIZED HEALTH CARE EDUCATION/TRAINING PROGRAM

## 2021-05-30 RX ORDER — METOPROLOL SUCCINATE 50 MG/1
50 TABLET, EXTENDED RELEASE ORAL
Status: DISCONTINUED | OUTPATIENT
Start: 2021-05-31 | End: 2021-05-30

## 2021-05-30 RX ORDER — METOPROLOL SUCCINATE 25 MG/1
25 TABLET, EXTENDED RELEASE ORAL
Status: DISCONTINUED | OUTPATIENT
Start: 2021-05-31 | End: 2021-06-01

## 2021-05-30 NOTE — PROGRESS NOTES
[facility]  Progress Note     Buster Presume Patient Status:  Inpatient    3/5/1935 MRN [id number]   [facility] 8NE-A Attending Lucho Salas MD   [address] Day # 6 PCP Marcy Rowe MD     Chief Complaint: Chest pain     S: Patient a 1.14   < > 1.19  --  0.98  --  0.73   GFRAA 68   < > 64  --  82  --  98   GFRNAA 59*   < > 56*  --  71  --  85   CA 8.6   < > 8.7  --  8.2*  --  8.4*   ALB 3.6  --   --   --   --   --   --       < > 139  --  140  --  137   K 3.7   < > 4.1   < > 3.3* Oral Nightly       ASSESSMENT / PLAN:     1. NSTEMI s/p 3 v CABG  2.  H pylori   3. DM, type 2    Plan of care:   Continue to monitor  DC planning in AM    Quality:  · DVT Prophylaxis: SCD  · CODE status: full  · Arzate: no  · Central line: no  · If COVID te

## 2021-05-30 NOTE — PROGRESS NOTES
[facility]  Cardiology Progress Note    Niesha Bueno Patient Status:  Inpatient    3/5/1935 MRN [id number]   [facility] 6NE-A Attending Maggy Lima MD   [facility] Day # 6 PCP Ruben Dean MD       Subjective:  Doing well.  Constipati 40 mg Intravenous QAM AC    Or   • Pantoprazole Sodium  40 mg Oral QAM AC   • atorvastatin  80 mg Oral Nightly   • metoprolol tartrate  12.5 mg Oral 2x Daily(Beta Blocker)     • sodium chloride 30 mL/hr at 05/27/21 0400       Assessment:    • Cad presentin

## 2021-05-30 NOTE — PROGRESS NOTES
[facility]   CVS Progress Note    Elida Hedrick Patient Status:  Inpatient    3/5/1935 MRN [id number]   [facility] 8NE-A Attending Cristian Tim MD   Hosp Day # 6 PCP Fran Pérez MD     Subjective:  Pt up to chair, doing well. 1-5 Units, 1-5 Units, Subcutaneous, TID CC and HS  0.9% NaCl infusion, , Intravenous, Continuous  HYDROcodone-acetaminophen (NORCO)  MG per tab 1 tablet, 1 tablet, Oral, Q4H PRN   Or  HYDROcodone-acetaminophen (NORCO)  MG per tab 2 tablet, 2 ta myocardial infarction, unspecified MI type, unspecified artery ([facility] [address]      POD# 4 s/p CABG  - Pt is HD stable  - NSR/ST; transient junctional rhythm resolved; BB increased today per cardiology  - ASA/Plavix  - Encourage IS/Ambulation  - Pain management  - P

## 2021-05-30 NOTE — PLAN OF CARE
Denies c/o pain, malaise, or cardiac symptoms. Remains in NSR-ST.  BB increased. Bps WNL. Lungs  clear on RA. Negative for edema. Midsternal chest CDI and painted with betadine. Graft sites on LLE painted with betadine as well, no complications.   Ab interventions  Outcome: Progressing  Goal: Patient/Family Short Term Goal  Description: Patient's Short Term Goal: no chest pain    Interventions:   - communicate with RN regarding pain   -attend all tests ordered  -ask questions as needed    - See additio and minimize respiratory effort  - Oxygen supplementation based on oxygen saturation or ABGs  - Provide Smoking Cessation handout, if applicable  - Encourage broncho-pulmonary hygiene including cough, deep breathe, Incentive Spirometry  - Assess the need f Monitor labs and assess for signs and symptoms of volume excess or deficit  - Monitor intake, output and patient weight  - Monitor urine specific gravity, serum osmolarity and serum sodium as indicated or ordered  - Monitor response to interventions for pa Progressing  Goal: Free from bleeding injury  Description: (Example usage: patient with low platelets)  INTERVENTIONS:  - Avoid intramuscular injections, enemas and rectal medication administration  - Ensure safe mobilization of patient  - Hold pressure on

## 2021-05-31 PROCEDURE — 99232 SBSQ HOSP IP/OBS MODERATE 35: CPT | Performed by: INTERNAL MEDICINE

## 2021-05-31 RX ORDER — METOPROLOL SUCCINATE 25 MG/1
25 TABLET, EXTENDED RELEASE ORAL ONCE
Status: COMPLETED | OUTPATIENT
Start: 2021-05-31 | End: 2021-05-31

## 2021-05-31 RX ORDER — CETIRIZINE HYDROCHLORIDE 10 MG/1
10 TABLET ORAL ONCE
Status: COMPLETED | OUTPATIENT
Start: 2021-05-31 | End: 2021-05-31

## 2021-05-31 NOTE — PLAN OF CARE
Pt A&O x 4, SPO2 100% on RA, sinus tachy with PAC's on tele, HR in 110's since ambulation in the hallway this AM. Dr. Melonie Koenig, cardiology, aware. Iveth Collado, CV surgery, notified. Frankie Roughen Up with standby assist with walker.  Mid-sternal and left leg incision with steri-st appointments  -understand potential side effects of medications  -know when to call physician with side effects   - See additional Care Plan goals for specific interventions  Outcome: Progressing  Goal: Patient/Family Short Term Goal  Description: Patient' oxygenation  Description: INTERVENTIONS:  - Assess for changes in respiratory status  - Assess for changes in mentation and behavior  - Position to facilitate oxygenation and minimize respiratory effort  - Oxygen supplementation based on oxygen saturation fluid and nutrition restrictions as appropriate  Outcome: Progressing  Goal: Hemodynamic stability and optimal renal function maintained  Description: INTERVENTIONS:  - Monitor labs and assess for signs and symptoms of volume excess or deficit  - Monitor i blood products/factors, fluids and medications as ordered and appropriate  - Administer supportive blood products/factors as ordered and appropriate  Outcome: Progressing  Goal: Free from bleeding injury  Description: (Example usage: patient with low plate

## 2021-05-31 NOTE — PHYSICAL THERAPY NOTE
PHYSICAL THERAPY TREATMENT NOTE - INPATIENT    Room Number: 5669/3971-N     Session: 1  Number of Visits to Meet Established Goals: 3    Presenting Problem: NSTEMI, s/p CABG x3 5/26/21     History related to current admission:  Patient was admitted from  up from a chair with arms (e.g., wheelchair, bedside commode, etc.): None   -   Moving from lying on back to sitting on the side of the bed?: None   How much help from another person does the patient currently need. ..   -   Moving to and from a bed to a ch (hip hinge)        CHEST EXERCISES    Trunk Rotation 10   Elbow Circles 10   Chest Fly's  10   Scapular Retraction 10       Patient End of Session: In bed;Needs met;Call light within reach;RN aware of session/findings; All patient questions and concerns add

## 2021-05-31 NOTE — PROGRESS NOTES
[facility]   CVS Progress Note    Edy Raphael Patient Status:  Inpatient    3/5/1935 MRN [id number]   [facility] 8NE-A Attending Stephen Sinclair MD   [facility] Day # 7 PCP Ophelia Lozada MD     Subjective:  Pt up to chair, feeling well Oral, Q15 Min PRN  Insulin Aspart Pen (NOVOLOG) 100 UNIT/ML flexpen 1-5 Units, 1-5 Units, Subcutaneous, TID CC and HS  0.9% NaCl infusion, , Intravenous, Continuous  HYDROcodone-acetaminophen (NORCO)  MG per tab 1 tablet, 1 tablet, Oral, Q4H PRN   Or infarction, unspecified MI type, unspecified artery ([facility] [address]      POD# 5 s/p CABG  - Pt is HD stable  - ST/PACs: BB dose adjusted per cardiology  - ASA/Plavix  - Encourage IS/Ambulation  - Pain management  - Plan to stay one more day to monitor HR/rhythm    D

## 2021-05-31 NOTE — PLAN OF CARE
Assumed care of patient at [address] [facility]. Patient AOX4. O2 sats >90% on RA. NSR/sinus tach on tele w/ PACs. BB dose adjusted 5/30 per cardiology, will continue to monitor closely. VSS. Denies pain. POD#4 CABG X3. Midsternal, L leg donor sites with dressings CDI.  SC Patient/Family Long Term Goal  Description: Patient's Long Term Goal: stay out of hospital    Interventions:  - take all meds as prescribed  -attend all follow up appointments  -understand potential side effects of medications  -know when to call physician [place] fall precautions as indicated by assessment.  - Educate pt/family on patient safety including physical limitations  - Instruct pt to call for assistance with activity based on assessment  - Modify environment to reduce risk of injury  - Provide a Glucose maintained within prescribed range  Description: INTERVENTIONS:  - Monitor Blood Glucose as ordered  - Assess for signs and symptoms of hyperglycemia and hypoglycemia  - Administer ordered medications to maintain glucose within target range  - Asse Assess and document skin integrity  - Monitor for areas of redness and/or skin breakdown  - Initiate interventions, skin care algorithm/standards of care as needed  5/30/2021 2324 by Praveen Carolina RN  Outcome: Progressing  5/30/2021 2323 by Naina Pablo on venipuncture sites to achieve adequate hemostasis  - Assess for signs and symptoms of internal bleeding  - Monitor lab trends  - Patient is to report abnormal signs of bleeding to staff  - Avoid use of toothpicks and dental floss  - Use electric shaver

## 2021-05-31 NOTE — PROGRESS NOTES
[facility]  Cardiology Progress Note    Lilia Leventhal Patient Status:  Inpatient    3/5/1935 MRN [id number]   [facility] 6NE-A Attending Balwinder Lane MD   [address] Day # 7 PCP Jde Mcbride MD       Subjective:  Just walked this [facility] [place] Subsalicylate  1 tablet Oral QID   • metRONIDAZOLE  500 mg Oral Q8H [address]   • Tetracycline HCl  500 mg Oral QID   • aspirin EC  81 mg Oral Daily    Or   • aspirin  150 mg Rectal Daily   • Insulin Aspart Pen  1-5 Units Subcutaneous TID CC and HS   • d

## 2021-06-01 VITALS
OXYGEN SATURATION: 99 % | RESPIRATION RATE: 18 BRPM | TEMPERATURE: 99 F | SYSTOLIC BLOOD PRESSURE: 98 MMHG | DIASTOLIC BLOOD PRESSURE: 48 MMHG | BODY MASS INDEX: 19.27 KG/M2 | HEIGHT: 66 IN | WEIGHT: 119.94 LBS | HEART RATE: 86 BPM

## 2021-06-01 PROCEDURE — 99232 SBSQ HOSP IP/OBS MODERATE 35: CPT | Performed by: INTERNAL MEDICINE

## 2021-06-01 PROCEDURE — 99239 HOSP IP/OBS DSCHRG MGMT >30: CPT | Performed by: INTERNAL MEDICINE

## 2021-06-01 RX ORDER — ATORVASTATIN CALCIUM 80 MG/1
80 TABLET, FILM COATED ORAL NIGHTLY
Qty: 30 TABLET | Refills: 3 | Status: SHIPPED | OUTPATIENT
Start: 2021-06-01

## 2021-06-01 RX ORDER — ASPIRIN 81 MG/1
81 TABLET ORAL DAILY
Qty: 30 TABLET | Refills: 0 | Status: SHIPPED | COMMUNITY
Start: 2021-06-02

## 2021-06-01 RX ORDER — CLOPIDOGREL BISULFATE 75 MG/1
75 TABLET ORAL DAILY
Qty: 30 TABLET | Refills: 3 | Status: SHIPPED | OUTPATIENT
Start: 2021-06-02

## 2021-06-01 NOTE — PLAN OF CARE
Pt A&O x 4, SPO2 95% on RA, sinus tachy on tele, HR in 110's. Up with standby assist with walker. Mid-sternal and left leg incision with steri-strips, painted with betadine. Encouraged deep breathing, use of IS and ambulation.  Maintained on antibiotic rosalba Patient/Family Short Term Goal  Description: Patient's Short Term Goal: no chest pain    Interventions:   - communicate with RN regarding pain   -attend all tests ordered  -ask questions as needed    - See additional Care Plan goals for specific interventi supplementation based on oxygen saturation or ABGs  - Provide Smoking Cessation handout, if applicable  - Encourage broncho-pulmonary hygiene including cough, deep breathe, Incentive Spirometry  - Assess the need for suctioning and perform as needed  - Ass symptoms of volume excess or deficit  - Monitor intake, output and patient weight  - Monitor urine specific gravity, serum osmolarity and serum sodium as indicated or ordered  - Monitor response to interventions for patient's volume status, including labs, injury  Description: (Example usage: patient with low platelets)  INTERVENTIONS:  - Avoid intramuscular injections, enemas and rectal medication administration  - Ensure safe mobilization of patient  - Hold pressure on venipuncture sites to achieve adequat

## 2021-06-01 NOTE — OCCUPATIONAL THERAPY NOTE
OCCUPATIONAL THERAPY TREATMENT NOTE - INPATIENT     Room Number: 1858/3280-G  Session: 1   Number of Visits to Meet Established Goals: 5    Presenting Problem: chest pain, MI, 5/26 CABG    History related to current admission: Patient was admitted from [facility] (AM-PAC Scale): 47.1  CMS Modifier (G-Code): CJ    FUNCTIONAL TRANSFER ASSESSMENT  Supine to Sit : Supervision  Sit to Stand: Supervision    Skilled Therapy Provided:   Pt received semi-supine in bed. Pt re-educated on sternal precautions.  Pt verbalized go training;Functional transfer training;UE strengthening/ROM; Patient/Family education;Patient/Family training;Equipment eval/education; Compensatory technique education  Rehab Potential : Good  Frequency (Obs): 3-5x/week      OT Goals:     ADL Goals   Patient

## 2021-06-01 NOTE — PLAN OF CARE
Assumed care of patient at 1. Patient AOX4, O2 sat >90% on RA. NSR on tele. Rates down to 70s. Will continue to monitor rate and rhythm closely. VSS. S/p CABGX3. Midsternal and L leg incisions CDI, painted with betadine. SCDs in place. Ambulating well. side effects of medications  -know when to call physician with side effects   - See additional Care Plan goals for specific interventions  Outcome: Progressing  Goal: Patient/Family Short Term Goal  Description: Patient's Short Term Goal: no chest pain Assess for changes in respiratory status  - Assess for changes in mentation and behavior  - Position to facilitate oxygenation and minimize respiratory effort  - Oxygen supplementation based on oxygen saturation or ABGs  - Provide Smoking Cessation handout appropriate  Outcome: Progressing  Goal: Hemodynamic stability and optimal renal function maintained  Description: INTERVENTIONS:  - Monitor labs and assess for signs and symptoms of volume excess or deficit  - Monitor intake, output and patient weight  - medications as ordered and appropriate  - Administer supportive blood products/factors as ordered and appropriate  Outcome: Progressing  Goal: Free from bleeding injury  Description: (Example usage: patient with low platelets)  INTERVENTIONS:  - Avoid intr

## 2021-06-01 NOTE — PROGRESS NOTES
[facility]  Cardiology Progress Note    Subjective:  No chest pain or shortness of breath. Says his head feels \"heavy\" today.     Objective:  /53 (BP Location: Right arm)   Pulse 80   Temp 98.6 °F (37 °C) (Oral)   Resp 16   Ht 5' 6\" (1.676 m) greater than 130 bpm after rest.  Continue ASA 81 mg daily, plavix 75 mg (ideally continue for one year due to NSTEMI presentation) and statin. OK to discharge from cardiac standpoint with follow-up already set-up.     D/w patient, son and bedside RN    Am

## 2021-06-01 NOTE — PROGRESS NOTES
[facility]     CV Surgery Progress Note    Carol Collado Patient Status:  Inpatient    3/5/1935 MRN [id number]   [facility] 6NE-A Attending Bharath Carr MD   [facility] Day # 8 PCP Cecil Shook MD     Subjective:  Patient eating breakf surgery standpoint once cleared with other services      -D/W Dr. Anne-Marie Tucker PA-C  Cardiothoracic Surgery   5/28/2021  9:31 AM

## 2021-06-01 NOTE — PROGRESS NOTES
[facility]  Progress Note     Amara Mayerw Patient Status:  Inpatient    3/5/1935 MRN [id number]   [facility] 8NE-A Attending Az Bray MD   [facility] Day # 8 PCP Lucía Jon MD     Chief Complaint: Chest pain     S: Patient 98 97   GFRNAA 71  --   --  85 84   CA 8.2*  --   --  8.4* 8.6     --   --  137 140   K 3.3*   < > 4.2 4.5 4.3     --   --  106 110   CO2 30.0  --   --  25.0 23.0    < > = values in this interval not displayed.        Estimated Creatinine Cleara full  · Arzate: no  · Central line: no  · If COVID testing is negative, may discontinue isolation: yes      Will the patient be referred to [facility] on discharge?: no  Estimated date of discharge: today?    Discharge is dependent on: clinical   At this point

## 2021-06-01 NOTE — PHYSICAL THERAPY NOTE
PHYSICAL THERAPY TREATMENT NOTE - INPATIENT    Room Number: 5789/6088-O     Session: 2  Number of Visits to Meet Established Goals: 3    Presenting Problem: NSTEMI, s/p CABG x3 5/26/21     History related to current admission:  Patient was admitted from  How much help from another person does the patient currently need. ..   -   Moving to and from a bed to a chair (including a wheelchair)?: None   -   Need to walk in hospital room?: A Little   -   Climbing 3-5 steps with a railing?: A Little       AM-PAC Discharge Recommendations: Home with home health PT     PLAN  PT Treatment Plan: Bed mobility; Body mechanics; Patient education; Family education;Gait training;Strengthening;Stair training;Transfer training;Balance training  Rehab Potential : Good  Frequency

## 2021-06-02 NOTE — PROGRESS NOTES
Explained discharge instructions including medications and follow ups to the patient and his son, verbalize understanding, IV removed, tele monitor discontinued, will be transported via wheelchair.

## 2021-06-02 NOTE — DISCHARGE SUMMARY
[facility]  DISCHARGE SUMMARY     Alexey Velasquez Patient Status:  Inpatient    3/5/1935 MRN [id number]   [facility] 8NE-A Attending No att. providers found   [facility] Day # 8 PCP Geronimo Braden MD     Date of Admission: 2021  Date health. Will be going home with son and daughter. Patient also found to be new diabetic with an A1c of 7.2. Needs lifestyle modifications may need to be started on at least low-dose Metformin. Will need follow-up with PCP.     Lace+ Score: 72  59-90 Hig results pending at Discharge:   · None    Consultants:  • Cardiology, CV surgery    Discharge Medication List:     Discharge Medications      START taking these medications      Instructions Prescription details   aspirin 81 MG Tbec  Start taking on: June Phone: [phone]   · atorvastatin 80 MG Tabs  · Clopidogrel Bisulfate 75 MG Tabs     Please  your prescriptions at the location directed by your doctor or nurse    Bring a paper prescription for each of these medications  · HYDROcodone-acetami intact  -----------------------------------------------------------------------------------------------  PATIENT DISCHARGE INSTRUCTIONS: See electronic chart    Sherin Cutler NP    Time spent:  > 30 minutes

## 2021-06-04 ENCOUNTER — TELEPHONE (OUTPATIENT)
Dept: CARDIOLOGY UNIT | Facility: HOSPITAL | Age: 86
End: 2021-06-04

## 2021-06-04 NOTE — PROGRESS NOTES
Follow Up Phone Call    1. How are you doing now that you are home? Well. 2. Have there been any changes in your wound/incision since going home? None    3. Is your pain manageable at home? Yes    4. Are you following the walking routine given to you in the hospital? Yes, [facility] came today    5. Are you continuing to use your incentive spirometer? IS 2000    6. Do you have your appointments for    6/10 Steri strips removal     Chest Xray? 6/11                                                                Primary MD?  Carolinaabida Escalante not made an appt but will call today to get an appt in for next week. Cardiologist? 6/11 Syed Ambrocio (Dr Harika Rivero) at 2:30 pm                                                                Dr. Meghnaa Weiner? 6/23 Edwina Mackenzie at 0930 am                                                                Cardiac Rehab? 7/29 at 7 am    7. Do you have any other questions or concerns today? Doing well. No further questions.       Noreen Rubio  6/4/2021  12:52 PM

## 2021-06-11 ENCOUNTER — HOSPITAL ENCOUNTER (OUTPATIENT)
Dept: GENERAL RADIOLOGY | Facility: HOSPITAL | Age: 86
Discharge: HOME | End: 2021-06-11
Attending: THORACIC SURGERY (CARDIOTHORACIC VASCULAR SURGERY)
Payer: MEDICARE

## 2021-06-11 ENCOUNTER — OFFICE VISIT (OUTPATIENT)
Dept: CARDIOLOGY | Age: 86
End: 2021-06-11

## 2021-06-11 VITALS
BODY MASS INDEX: 19.66 KG/M2 | DIASTOLIC BLOOD PRESSURE: 54 MMHG | SYSTOLIC BLOOD PRESSURE: 98 MMHG | WEIGHT: 118 LBS | HEART RATE: 94 BPM | HEIGHT: 65 IN

## 2021-06-11 DIAGNOSIS — R00.0 TACHYCARDIA, UNSPECIFIED: ICD-10-CM

## 2021-06-11 DIAGNOSIS — Z09 HOSPITAL DISCHARGE FOLLOW-UP: Primary | ICD-10-CM

## 2021-06-11 DIAGNOSIS — E78.5 HYPERLIPIDEMIA WITH TARGET LDL LESS THAN 70: ICD-10-CM

## 2021-06-11 DIAGNOSIS — Z95.1 S/P CABG (CORONARY ARTERY BYPASS GRAFT): ICD-10-CM

## 2021-06-11 DIAGNOSIS — J90 PLEURAL EFFUSION: ICD-10-CM

## 2021-06-11 PROCEDURE — 93000 ELECTROCARDIOGRAM COMPLETE: CPT | Performed by: NURSE PRACTITIONER

## 2021-06-11 PROCEDURE — 99204 OFFICE O/P NEW MOD 45 MIN: CPT | Performed by: NURSE PRACTITIONER

## 2021-06-11 PROCEDURE — 71048 X-RAY EXAM CHEST 4+ VIEWS: CPT | Performed by: THORACIC SURGERY (CARDIOTHORACIC VASCULAR SURGERY)

## 2021-06-11 RX ORDER — CLOPIDOGREL BISULFATE 75 MG/1
75 TABLET ORAL DAILY
COMMUNITY
Start: 2021-06-01 | End: 2021-06-11 | Stop reason: SDUPTHER

## 2021-06-11 RX ORDER — ATORVASTATIN CALCIUM 80 MG/1
80 TABLET, FILM COATED ORAL NIGHTLY
Qty: 90 TABLET | Refills: 3 | Status: SHIPPED | OUTPATIENT
Start: 2021-06-11

## 2021-06-11 RX ORDER — METOPROLOL SUCCINATE 50 MG/1
50 TABLET, EXTENDED RELEASE ORAL AT BEDTIME
Qty: 30 TABLET | Refills: 3 | Status: SHIPPED | OUTPATIENT
Start: 2021-06-11

## 2021-06-11 RX ORDER — PANTOPRAZOLE SODIUM 40 MG/1
TABLET, DELAYED RELEASE ORAL
COMMUNITY
Start: 2021-05-06

## 2021-06-11 RX ORDER — ASPIRIN 81 MG/1
81 TABLET ORAL
COMMUNITY
Start: 2021-06-02

## 2021-06-11 RX ORDER — CLOPIDOGREL BISULFATE 75 MG/1
75 TABLET ORAL DAILY
Qty: 90 TABLET | Refills: 3 | Status: SHIPPED | OUTPATIENT
Start: 2021-06-11

## 2021-06-11 RX ORDER — ATORVASTATIN CALCIUM 80 MG/1
80 TABLET, FILM COATED ORAL NIGHTLY
COMMUNITY
Start: 2021-06-01 | End: 2021-06-11 | Stop reason: SDUPTHER

## 2021-06-11 RX ORDER — METOPROLOL SUCCINATE 25 MG/1
25 TABLET, EXTENDED RELEASE ORAL DAILY
COMMUNITY
Start: 2021-05-06 | End: 2021-06-11 | Stop reason: SDUPTHER

## 2021-06-11 ASSESSMENT — PATIENT HEALTH QUESTIONNAIRE - PHQ9
2. FEELING DOWN, DEPRESSED OR HOPELESS: NOT AT ALL
CLINICAL INTERPRETATION OF PHQ9 SCORE: NO FURTHER SCREENING NEEDED
1. LITTLE INTEREST OR PLEASURE IN DOING THINGS: NOT AT ALL
SUM OF ALL RESPONSES TO PHQ9 QUESTIONS 1 AND 2: 0
CLINICAL INTERPRETATION OF PHQ2 SCORE: NO FURTHER SCREENING NEEDED
SUM OF ALL RESPONSES TO PHQ9 QUESTIONS 1 AND 2: 0

## 2021-06-16 ENCOUNTER — TELEPHONE (OUTPATIENT)
Dept: CARDIOLOGY | Age: 86
End: 2021-06-16

## 2021-06-18 ENCOUNTER — LAB ENCOUNTER (OUTPATIENT)
Dept: LAB | Age: 86
End: 2021-06-18
Attending: NURSE PRACTITIONER
Payer: MEDICARE

## 2021-06-18 ENCOUNTER — ORDER TRANSCRIPTION (OUTPATIENT)
Dept: ADMINISTRATIVE | Facility: HOSPITAL | Age: 86
End: 2021-06-18

## 2021-06-18 DIAGNOSIS — Z11.59 ENCOUNTER FOR SCREENING FOR OTHER VIRAL DISEASES: ICD-10-CM

## 2021-06-18 DIAGNOSIS — Z01.818 PRE-OP TESTING: Primary | ICD-10-CM

## 2021-06-18 DIAGNOSIS — Z01.818 PRE-OP TESTING: ICD-10-CM

## 2021-06-18 NOTE — CONSULTS
RE: Bird Shape  :  1935      The patient is a very pleasant 29-year-old gentleman who has been in a state of very good health.   He has had several episodes of chest pain, including a sustained episode culminating in admission to the hospital

## 2021-06-21 ENCOUNTER — HOSPITAL ENCOUNTER (OUTPATIENT)
Dept: CV DIAGNOSTICS | Facility: HOSPITAL | Age: 86
Discharge: HOME | End: 2021-06-21
Attending: NURSE PRACTITIONER
Payer: MEDICARE

## 2021-06-21 DIAGNOSIS — Z95.1 S/P CABG (CORONARY ARTERY BYPASS GRAFT): ICD-10-CM

## 2021-06-21 DIAGNOSIS — R00.0 TACHYCARDIA, UNSPECIFIED: ICD-10-CM

## 2021-06-21 PROCEDURE — 93017 CV STRESS TEST TRACING ONLY: CPT | Performed by: NURSE PRACTITIONER

## 2021-06-21 PROCEDURE — 93018 CV STRESS TEST I&R ONLY: CPT | Performed by: NURSE PRACTITIONER

## 2021-07-16 ENCOUNTER — LAB ENCOUNTER (OUTPATIENT)
Dept: LAB | Facility: HOSPITAL | Age: 86
End: 2021-07-16
Attending: STUDENT IN AN ORGANIZED HEALTH CARE EDUCATION/TRAINING PROGRAM
Payer: MEDICARE

## 2021-07-16 DIAGNOSIS — A04.8 H. PYLORI INFECTION: ICD-10-CM

## 2021-07-16 PROCEDURE — 83013 H PYLORI (C-13) BREATH: CPT

## 2021-07-18 LAB — H. PYLORI BREATH TEST: POSITIVE

## 2021-07-22 NOTE — PROGRESS NOTES
Called pt. Breath test H pylori positive  Levoflox triple therapy ordered  Repeat breath test in 4 weeks s/p therapy, confirm eradication    --  Young Santiago,    I wanted to summarize our discussion over the phone today.  The breath test showed that you still ha

## 2021-08-06 ENCOUNTER — APPOINTMENT (OUTPATIENT)
Dept: CARDIOLOGY | Age: 86
End: 2021-08-06

## 2021-08-12 ENCOUNTER — CARDPULM VISIT (OUTPATIENT)
Dept: CARDIAC REHAB | Facility: HOSPITAL | Age: 86
End: 2021-08-12
Attending: INTERNAL MEDICINE
Payer: MEDICARE

## 2021-08-12 VITALS
DIASTOLIC BLOOD PRESSURE: 50 MMHG | HEIGHT: 66 IN | BODY MASS INDEX: 19.35 KG/M2 | OXYGEN SATURATION: 98 % | HEART RATE: 58 BPM | SYSTOLIC BLOOD PRESSURE: 106 MMHG | WEIGHT: 120.38 LBS

## 2021-08-12 PROCEDURE — 93798 PHYS/QHP OP CAR RHAB W/ECG: CPT

## 2021-08-16 ENCOUNTER — APPOINTMENT (OUTPATIENT)
Dept: CARDIAC REHAB | Facility: HOSPITAL | Age: 86
End: 2021-08-16
Attending: INTERNAL MEDICINE
Payer: MEDICARE

## 2021-08-18 ENCOUNTER — CARDPULM VISIT (OUTPATIENT)
Dept: CARDIAC REHAB | Facility: HOSPITAL | Age: 86
End: 2021-08-18
Attending: INTERNAL MEDICINE
Payer: MEDICARE

## 2021-08-18 PROCEDURE — 93798 PHYS/QHP OP CAR RHAB W/ECG: CPT

## 2021-08-19 ENCOUNTER — APPOINTMENT (OUTPATIENT)
Dept: CARDIAC REHAB | Facility: HOSPITAL | Age: 86
End: 2021-08-19
Attending: INTERNAL MEDICINE
Payer: MEDICARE

## 2021-08-23 ENCOUNTER — CARDPULM VISIT (OUTPATIENT)
Dept: CARDIAC REHAB | Facility: HOSPITAL | Age: 86
End: 2021-08-23
Attending: INTERNAL MEDICINE
Payer: MEDICARE

## 2021-08-23 PROCEDURE — 93798 PHYS/QHP OP CAR RHAB W/ECG: CPT

## 2021-08-25 ENCOUNTER — CARDPULM VISIT (OUTPATIENT)
Dept: CARDIAC REHAB | Facility: HOSPITAL | Age: 86
End: 2021-08-25
Attending: INTERNAL MEDICINE
Payer: MEDICARE

## 2021-08-25 PROCEDURE — 93798 PHYS/QHP OP CAR RHAB W/ECG: CPT

## 2021-08-26 ENCOUNTER — CARDPULM VISIT (OUTPATIENT)
Dept: CARDIAC REHAB | Facility: HOSPITAL | Age: 86
End: 2021-08-26
Attending: INTERNAL MEDICINE
Payer: MEDICARE

## 2021-08-26 PROCEDURE — 93798 PHYS/QHP OP CAR RHAB W/ECG: CPT

## 2021-08-30 ENCOUNTER — CARDPULM VISIT (OUTPATIENT)
Dept: CARDIAC REHAB | Facility: HOSPITAL | Age: 86
End: 2021-08-30
Attending: INTERNAL MEDICINE
Payer: MEDICARE

## 2021-08-30 PROCEDURE — 93798 PHYS/QHP OP CAR RHAB W/ECG: CPT

## 2021-09-01 ENCOUNTER — CARDPULM VISIT (OUTPATIENT)
Dept: CARDIAC REHAB | Facility: HOSPITAL | Age: 86
End: 2021-09-01
Attending: INTERNAL MEDICINE
Payer: MEDICARE

## 2021-09-01 PROCEDURE — 93798 PHYS/QHP OP CAR RHAB W/ECG: CPT

## 2021-09-02 ENCOUNTER — CARDPULM VISIT (OUTPATIENT)
Dept: CARDIAC REHAB | Facility: HOSPITAL | Age: 86
End: 2021-09-02
Attending: INTERNAL MEDICINE
Payer: MEDICARE

## 2021-09-02 PROCEDURE — 93798 PHYS/QHP OP CAR RHAB W/ECG: CPT

## 2021-09-06 ENCOUNTER — APPOINTMENT (OUTPATIENT)
Dept: CARDIAC REHAB | Facility: HOSPITAL | Age: 86
End: 2021-09-06
Attending: INTERNAL MEDICINE
Payer: MEDICARE

## 2021-09-08 ENCOUNTER — CARDPULM VISIT (OUTPATIENT)
Dept: CARDIAC REHAB | Facility: HOSPITAL | Age: 86
End: 2021-09-08
Attending: INTERNAL MEDICINE
Payer: MEDICARE

## 2021-09-08 PROCEDURE — 93798 PHYS/QHP OP CAR RHAB W/ECG: CPT

## 2021-09-09 ENCOUNTER — CARDPULM VISIT (OUTPATIENT)
Dept: CARDIAC REHAB | Facility: HOSPITAL | Age: 86
End: 2021-09-09
Attending: INTERNAL MEDICINE
Payer: MEDICARE

## 2021-09-09 PROCEDURE — 93798 PHYS/QHP OP CAR RHAB W/ECG: CPT

## 2021-09-13 ENCOUNTER — CARDPULM VISIT (OUTPATIENT)
Dept: CARDIAC REHAB | Facility: HOSPITAL | Age: 86
End: 2021-09-13
Attending: INTERNAL MEDICINE
Payer: MEDICARE

## 2021-09-13 PROCEDURE — 93798 PHYS/QHP OP CAR RHAB W/ECG: CPT

## 2021-09-14 ENCOUNTER — LAB ENCOUNTER (OUTPATIENT)
Dept: LAB | Facility: HOSPITAL | Age: 86
End: 2021-09-14
Attending: STUDENT IN AN ORGANIZED HEALTH CARE EDUCATION/TRAINING PROGRAM
Payer: MEDICARE

## 2021-09-14 DIAGNOSIS — A04.8 H. PYLORI INFECTION: ICD-10-CM

## 2021-09-15 ENCOUNTER — CARDPULM VISIT (OUTPATIENT)
Dept: CARDIAC REHAB | Facility: HOSPITAL | Age: 86
End: 2021-09-15
Attending: INTERNAL MEDICINE
Payer: MEDICARE

## 2021-09-15 PROCEDURE — 93798 PHYS/QHP OP CAR RHAB W/ECG: CPT

## 2021-09-16 ENCOUNTER — CARDPULM VISIT (OUTPATIENT)
Dept: CARDIAC REHAB | Facility: HOSPITAL | Age: 86
End: 2021-09-16
Attending: INTERNAL MEDICINE
Payer: MEDICARE

## 2021-09-16 PROCEDURE — 93798 PHYS/QHP OP CAR RHAB W/ECG: CPT

## 2021-09-20 ENCOUNTER — CARDPULM VISIT (OUTPATIENT)
Dept: CARDIAC REHAB | Facility: HOSPITAL | Age: 86
End: 2021-09-20
Attending: INTERNAL MEDICINE
Payer: MEDICARE

## 2021-09-20 PROCEDURE — 93798 PHYS/QHP OP CAR RHAB W/ECG: CPT

## 2021-09-22 ENCOUNTER — CARDPULM VISIT (OUTPATIENT)
Dept: CARDIAC REHAB | Facility: HOSPITAL | Age: 86
End: 2021-09-22
Attending: INTERNAL MEDICINE
Payer: MEDICARE

## 2021-09-22 PROCEDURE — 93798 PHYS/QHP OP CAR RHAB W/ECG: CPT

## 2021-09-23 ENCOUNTER — CARDPULM VISIT (OUTPATIENT)
Dept: CARDIAC REHAB | Facility: HOSPITAL | Age: 86
End: 2021-09-23
Attending: INTERNAL MEDICINE
Payer: MEDICARE

## 2021-09-23 PROCEDURE — 93798 PHYS/QHP OP CAR RHAB W/ECG: CPT

## 2021-09-27 ENCOUNTER — CARDPULM VISIT (OUTPATIENT)
Dept: CARDIAC REHAB | Facility: HOSPITAL | Age: 86
End: 2021-09-27
Attending: INTERNAL MEDICINE
Payer: MEDICARE

## 2021-09-27 PROCEDURE — 93798 PHYS/QHP OP CAR RHAB W/ECG: CPT

## 2021-09-28 ENCOUNTER — LAB ENCOUNTER (OUTPATIENT)
Dept: LAB | Facility: HOSPITAL | Age: 86
End: 2021-09-28
Attending: STUDENT IN AN ORGANIZED HEALTH CARE EDUCATION/TRAINING PROGRAM
Payer: MEDICARE

## 2021-09-28 PROCEDURE — 83013 H PYLORI (C-13) BREATH: CPT

## 2021-09-29 ENCOUNTER — CARDPULM VISIT (OUTPATIENT)
Dept: CARDIAC REHAB | Facility: HOSPITAL | Age: 86
End: 2021-09-29
Attending: INTERNAL MEDICINE
Payer: MEDICARE

## 2021-09-29 LAB — H. PYLORI BREATH TEST: NEGATIVE

## 2021-09-29 PROCEDURE — 93798 PHYS/QHP OP CAR RHAB W/ECG: CPT

## 2021-09-30 ENCOUNTER — CARDPULM VISIT (OUTPATIENT)
Dept: CARDIAC REHAB | Facility: HOSPITAL | Age: 86
End: 2021-09-30
Attending: INTERNAL MEDICINE
Payer: MEDICARE

## 2021-09-30 PROCEDURE — 93798 PHYS/QHP OP CAR RHAB W/ECG: CPT

## 2021-10-04 ENCOUNTER — CARDPULM VISIT (OUTPATIENT)
Dept: CARDIAC REHAB | Facility: HOSPITAL | Age: 86
End: 2021-10-04
Attending: INTERNAL MEDICINE
Payer: MEDICARE

## 2021-10-06 ENCOUNTER — CARDPULM VISIT (OUTPATIENT)
Dept: CARDIAC REHAB | Facility: HOSPITAL | Age: 86
End: 2021-10-06
Attending: INTERNAL MEDICINE
Payer: MEDICARE

## 2021-10-06 PROCEDURE — 93798 PHYS/QHP OP CAR RHAB W/ECG: CPT

## 2021-10-07 ENCOUNTER — CARDPULM VISIT (OUTPATIENT)
Dept: CARDIAC REHAB | Facility: HOSPITAL | Age: 86
End: 2021-10-07
Attending: INTERNAL MEDICINE
Payer: MEDICARE

## 2021-10-07 PROCEDURE — 93798 PHYS/QHP OP CAR RHAB W/ECG: CPT

## 2021-10-08 NOTE — PROGRESS NOTES
Hi Jack,    Your recent breath test shows NO signs of the H pylori infection, good news. Please let me know if you have any questions or concerns.      Sincerely,  Berhane Callahan MD

## 2021-10-11 ENCOUNTER — CARDPULM VISIT (OUTPATIENT)
Dept: CARDIAC REHAB | Facility: HOSPITAL | Age: 86
End: 2021-10-11
Attending: INTERNAL MEDICINE
Payer: MEDICARE

## 2021-10-11 PROCEDURE — 93798 PHYS/QHP OP CAR RHAB W/ECG: CPT

## 2021-10-13 ENCOUNTER — CARDPULM VISIT (OUTPATIENT)
Dept: CARDIAC REHAB | Facility: HOSPITAL | Age: 86
End: 2021-10-13
Attending: INTERNAL MEDICINE
Payer: MEDICARE

## 2021-10-13 PROCEDURE — 93798 PHYS/QHP OP CAR RHAB W/ECG: CPT

## 2021-10-14 ENCOUNTER — CARDPULM VISIT (OUTPATIENT)
Dept: CARDIAC REHAB | Facility: HOSPITAL | Age: 86
End: 2021-10-14
Attending: INTERNAL MEDICINE
Payer: MEDICARE

## 2021-10-14 PROCEDURE — 93798 PHYS/QHP OP CAR RHAB W/ECG: CPT

## 2021-10-18 ENCOUNTER — CARDPULM VISIT (OUTPATIENT)
Dept: CARDIAC REHAB | Facility: HOSPITAL | Age: 86
End: 2021-10-18
Attending: INTERNAL MEDICINE
Payer: MEDICARE

## 2021-10-18 PROCEDURE — 93798 PHYS/QHP OP CAR RHAB W/ECG: CPT

## 2021-10-20 ENCOUNTER — CARDPULM VISIT (OUTPATIENT)
Dept: CARDIAC REHAB | Facility: HOSPITAL | Age: 86
End: 2021-10-20
Attending: INTERNAL MEDICINE
Payer: MEDICARE

## 2021-10-20 PROCEDURE — 93798 PHYS/QHP OP CAR RHAB W/ECG: CPT

## 2021-10-21 ENCOUNTER — CARDPULM VISIT (OUTPATIENT)
Dept: CARDIAC REHAB | Facility: HOSPITAL | Age: 86
End: 2021-10-21
Attending: INTERNAL MEDICINE
Payer: MEDICARE

## 2021-10-21 PROCEDURE — 93798 PHYS/QHP OP CAR RHAB W/ECG: CPT

## 2021-10-25 ENCOUNTER — CARDPULM VISIT (OUTPATIENT)
Dept: CARDIAC REHAB | Facility: HOSPITAL | Age: 86
End: 2021-10-25
Attending: INTERNAL MEDICINE
Payer: MEDICARE

## 2021-10-25 PROCEDURE — 93798 PHYS/QHP OP CAR RHAB W/ECG: CPT

## 2021-10-27 ENCOUNTER — CARDPULM VISIT (OUTPATIENT)
Dept: CARDIAC REHAB | Facility: HOSPITAL | Age: 86
End: 2021-10-27
Attending: INTERNAL MEDICINE
Payer: MEDICARE

## 2021-10-27 PROCEDURE — 93798 PHYS/QHP OP CAR RHAB W/ECG: CPT

## 2021-10-28 ENCOUNTER — CARDPULM VISIT (OUTPATIENT)
Dept: CARDIAC REHAB | Facility: HOSPITAL | Age: 86
End: 2021-10-28
Attending: INTERNAL MEDICINE
Payer: MEDICARE

## 2021-10-28 PROCEDURE — 93798 PHYS/QHP OP CAR RHAB W/ECG: CPT

## 2021-11-01 ENCOUNTER — CARDPULM VISIT (OUTPATIENT)
Dept: CARDIAC REHAB | Facility: HOSPITAL | Age: 86
End: 2021-11-01
Attending: INTERNAL MEDICINE
Payer: MEDICARE

## 2021-11-01 PROCEDURE — 93798 PHYS/QHP OP CAR RHAB W/ECG: CPT

## 2021-11-03 ENCOUNTER — CARDPULM VISIT (OUTPATIENT)
Dept: CARDIAC REHAB | Facility: HOSPITAL | Age: 86
End: 2021-11-03
Attending: INTERNAL MEDICINE
Payer: MEDICARE

## 2021-11-03 PROCEDURE — 93798 PHYS/QHP OP CAR RHAB W/ECG: CPT

## 2021-11-04 ENCOUNTER — CARDPULM VISIT (OUTPATIENT)
Dept: CARDIAC REHAB | Facility: HOSPITAL | Age: 86
End: 2021-11-04
Attending: INTERNAL MEDICINE
Payer: MEDICARE

## 2021-11-04 PROCEDURE — 93798 PHYS/QHP OP CAR RHAB W/ECG: CPT

## 2021-11-08 ENCOUNTER — CARDPULM VISIT (OUTPATIENT)
Dept: CARDIAC REHAB | Facility: HOSPITAL | Age: 86
End: 2021-11-08
Attending: INTERNAL MEDICINE
Payer: MEDICARE

## 2021-11-08 PROCEDURE — 93798 PHYS/QHP OP CAR RHAB W/ECG: CPT

## 2021-12-16 ENCOUNTER — LAB ENCOUNTER (OUTPATIENT)
Dept: LAB | Facility: HOSPITAL | Age: 86
End: 2021-12-16
Attending: INTERNAL MEDICINE
Payer: MEDICARE

## 2021-12-16 DIAGNOSIS — I21.9: Primary | ICD-10-CM

## 2021-12-16 DIAGNOSIS — R00.0 SINUS TACHYCARDIA: ICD-10-CM

## 2021-12-16 DIAGNOSIS — R07.9 CHEST PAIN, UNSPECIFIED: ICD-10-CM

## 2021-12-16 PROCEDURE — 36415 COLL VENOUS BLD VENIPUNCTURE: CPT

## 2021-12-16 PROCEDURE — 80053 COMPREHEN METABOLIC PANEL: CPT

## 2021-12-16 PROCEDURE — 80061 LIPID PANEL: CPT

## 2022-05-11 ENCOUNTER — LAB ENCOUNTER (OUTPATIENT)
Dept: LAB | Facility: HOSPITAL | Age: 87
End: 2022-05-11
Attending: INTERNAL MEDICINE
Payer: MEDICARE

## 2022-05-11 DIAGNOSIS — I47.1 PAROXYSMAL SUPRAVENTRICULAR TACHYCARDIA (HCC): ICD-10-CM

## 2022-05-11 DIAGNOSIS — R07.9 CHEST PAIN, UNSPECIFIED: Primary | ICD-10-CM

## 2022-05-11 LAB
ALBUMIN SERPL-MCNC: 3.6 G/DL (ref 3.4–5)
ALBUMIN/GLOB SERPL: 1.1 {RATIO} (ref 1–2)
ALP LIVER SERPL-CCNC: 71 U/L
ALT SERPL-CCNC: 32 U/L
ANION GAP SERPL CALC-SCNC: 4 MMOL/L (ref 0–18)
AST SERPL-CCNC: 38 U/L (ref 15–37)
BILIRUB SERPL-MCNC: 1 MG/DL (ref 0.1–2)
BUN BLD-MCNC: 21 MG/DL (ref 7–18)
CALCIUM BLD-MCNC: 9 MG/DL (ref 8.5–10.1)
CHLORIDE SERPL-SCNC: 110 MMOL/L (ref 98–112)
CHOLEST SERPL-MCNC: 108 MG/DL (ref ?–200)
CO2 SERPL-SCNC: 27 MMOL/L (ref 21–32)
CREAT BLD-MCNC: 0.88 MG/DL
FASTING PATIENT LIPID ANSWER: YES
FASTING STATUS PATIENT QL REPORTED: YES
GLOBULIN PLAS-MCNC: 3.2 G/DL (ref 2.8–4.4)
GLUCOSE BLD-MCNC: 115 MG/DL (ref 70–99)
HDLC SERPL-MCNC: 55 MG/DL (ref 40–59)
LDLC SERPL CALC-MCNC: 36 MG/DL (ref ?–100)
NONHDLC SERPL-MCNC: 53 MG/DL (ref ?–130)
OSMOLALITY SERPL CALC.SUM OF ELEC: 296 MOSM/KG (ref 275–295)
POTASSIUM SERPL-SCNC: 4.6 MMOL/L (ref 3.5–5.1)
PROT SERPL-MCNC: 6.8 G/DL (ref 6.4–8.2)
SODIUM SERPL-SCNC: 141 MMOL/L (ref 136–145)
TRIGL SERPL-MCNC: 87 MG/DL (ref 30–149)
VLDLC SERPL CALC-MCNC: 12 MG/DL (ref 0–30)

## 2022-05-11 PROCEDURE — 36415 COLL VENOUS BLD VENIPUNCTURE: CPT

## 2022-05-11 PROCEDURE — 80061 LIPID PANEL: CPT

## 2022-05-11 PROCEDURE — 80053 COMPREHEN METABOLIC PANEL: CPT

## 2022-08-12 ENCOUNTER — HOSPITAL ENCOUNTER (EMERGENCY)
Facility: HOSPITAL | Age: 87
Discharge: HOME | End: 2022-08-12
Attending: EMERGENCY MEDICINE
Payer: MEDICARE

## 2022-08-12 VITALS
HEIGHT: 66 IN | BODY MASS INDEX: 19.93 KG/M2 | HEART RATE: 73 BPM | OXYGEN SATURATION: 96 % | SYSTOLIC BLOOD PRESSURE: 177 MMHG | WEIGHT: 124 LBS | RESPIRATION RATE: 17 BRPM | TEMPERATURE: 98 F | DIASTOLIC BLOOD PRESSURE: 68 MMHG

## 2022-08-12 DIAGNOSIS — H04.301 DACRYOCYSTITIS OF RIGHT LACRIMAL SAC: Primary | ICD-10-CM

## 2022-08-12 PROCEDURE — 99283 EMERGENCY DEPT VISIT LOW MDM: CPT

## 2022-08-12 RX ORDER — DOXYCYCLINE HYCLATE 100 MG/1
100 CAPSULE ORAL 2 TIMES DAILY
Qty: 20 CAPSULE | Refills: 0 | Status: SHIPPED | OUTPATIENT
Start: 2022-08-12 | End: 2022-08-22

## 2022-11-17 ENCOUNTER — LAB ENCOUNTER (OUTPATIENT)
Dept: LAB | Facility: HOSPITAL | Age: 87
End: 2022-11-17
Attending: INTERNAL MEDICINE
Payer: MEDICARE

## 2022-11-17 DIAGNOSIS — I47.1 PAT (PAROXYSMAL ATRIAL TACHYCARDIA) (HCC): ICD-10-CM

## 2022-11-17 DIAGNOSIS — R00.0 SINUS TACHYCARDIA: ICD-10-CM

## 2022-11-17 DIAGNOSIS — Z95.1 S/P CABG (CORONARY ARTERY BYPASS GRAFT): ICD-10-CM

## 2022-11-17 DIAGNOSIS — I21.9 ACUTE MI (HCC): Primary | ICD-10-CM

## 2022-11-17 LAB
ALBUMIN SERPL-MCNC: 3.8 G/DL (ref 3.4–5)
ALBUMIN/GLOB SERPL: 1.1 {RATIO} (ref 1–2)
ALP LIVER SERPL-CCNC: 78 U/L
ALT SERPL-CCNC: 37 U/L
ANION GAP SERPL CALC-SCNC: 3 MMOL/L (ref 0–18)
AST SERPL-CCNC: 28 U/L (ref 15–37)
BILIRUB SERPL-MCNC: 0.9 MG/DL (ref 0.1–2)
BUN BLD-MCNC: 17 MG/DL (ref 7–18)
CALCIUM BLD-MCNC: 9 MG/DL (ref 8.5–10.1)
CHLORIDE SERPL-SCNC: 110 MMOL/L (ref 98–112)
CHOLEST SERPL-MCNC: 123 MG/DL (ref ?–200)
CO2 SERPL-SCNC: 26 MMOL/L (ref 21–32)
CREAT BLD-MCNC: 0.84 MG/DL
FASTING PATIENT LIPID ANSWER: YES
FASTING STATUS PATIENT QL REPORTED: YES
GFR SERPLBLD BASED ON 1.73 SQ M-ARVRAT: 85 ML/MIN/1.73M2 (ref 60–?)
GLOBULIN PLAS-MCNC: 3.4 G/DL (ref 2.8–4.4)
GLUCOSE BLD-MCNC: 158 MG/DL (ref 70–99)
HDLC SERPL-MCNC: 62 MG/DL (ref 40–59)
LDLC SERPL CALC-MCNC: 48 MG/DL (ref ?–100)
NONHDLC SERPL-MCNC: 61 MG/DL (ref ?–130)
OSMOLALITY SERPL CALC.SUM OF ELEC: 293 MOSM/KG (ref 275–295)
POTASSIUM SERPL-SCNC: 4 MMOL/L (ref 3.5–5.1)
PROT SERPL-MCNC: 7.2 G/DL (ref 6.4–8.2)
SODIUM SERPL-SCNC: 139 MMOL/L (ref 136–145)
TRIGL SERPL-MCNC: 61 MG/DL (ref 30–149)
VLDLC SERPL CALC-MCNC: 9 MG/DL (ref 0–30)

## 2022-11-17 PROCEDURE — 36415 COLL VENOUS BLD VENIPUNCTURE: CPT

## 2022-11-17 PROCEDURE — 80053 COMPREHEN METABOLIC PANEL: CPT

## 2022-11-17 PROCEDURE — 80061 LIPID PANEL: CPT

## 2023-01-20 ENCOUNTER — LAB ENCOUNTER (OUTPATIENT)
Dept: LAB | Facility: HOSPITAL | Age: 88
End: 2023-01-20
Attending: INTERNAL MEDICINE
Payer: MEDICARE

## 2023-01-20 DIAGNOSIS — I47.1 SUPRAVENTRICULAR TACHYCARDIA, NONSUSTAINED (HCC): ICD-10-CM

## 2023-01-20 DIAGNOSIS — R07.9 CHEST PAIN, UNSPECIFIED: ICD-10-CM

## 2023-01-20 DIAGNOSIS — I21.9 ACUTE MYOCARDIAL INFARCTION (HCC): Primary | ICD-10-CM

## 2023-01-20 LAB
ALT SERPL-CCNC: 31 U/L
AST SERPL-CCNC: 28 U/L (ref 15–37)
CHOLEST SERPL-MCNC: 97 MG/DL (ref ?–200)
FASTING PATIENT LIPID ANSWER: YES
HDLC SERPL-MCNC: 55 MG/DL (ref 40–59)
LDLC SERPL CALC-MCNC: 30 MG/DL (ref ?–100)
NONHDLC SERPL-MCNC: 42 MG/DL (ref ?–130)
TRIGL SERPL-MCNC: 50 MG/DL (ref 30–149)
VLDLC SERPL CALC-MCNC: 7 MG/DL (ref 0–30)

## 2023-01-20 PROCEDURE — 80061 LIPID PANEL: CPT

## 2023-01-20 PROCEDURE — 84460 ALANINE AMINO (ALT) (SGPT): CPT

## 2023-01-20 PROCEDURE — 84450 TRANSFERASE (AST) (SGOT): CPT

## 2023-01-20 PROCEDURE — 36415 COLL VENOUS BLD VENIPUNCTURE: CPT

## 2023-05-04 NOTE — LETTER
Kate Smith 182  [address] [facility] [address], [address] [facility]  Authorization for Surgical Operation and Procedure     Date:___________                                                                                                         Time:__________ potential risks that can occur: fever and allergic reactions, hemolytic reactions, transmission of diseases such as Hepatitis, AIDS and Cytomegalovirus (CMV) and fluid overload.   In the event that I wish to have an autologous transfusion of my own blood, o will determine when the applicable recovery period ends for purposes of reinstating the DNAR order.   10. Patients having a sterilization procedure: I understand that if the procedure is successful the results will be permanent and it will therefore be impo doctor) to give me medicine and do additional procedures as necessary.  Some examples are: Starting or using an “IV” to give me medicine, fluids or blood during my procedure, and having a breathing tube placed to help me breathe when I’m asleep (intubation) understand that rare but potential complications include headache, bleeding, infection, seizure, irregular heart rhythms, and nerve injury.     I can change my mind about having anesthesia services at any time before I get the medicine.    _________________ Advancement-Rotation Flap Text: The defect edges were debeveled with a #15 scalpel blade.  Given the location of the defect, shape of the defect and the proximity to free margins an advancement-rotation flap was deemed most appropriate.  Using a sterile surgical marker, an appropriate flap was drawn incorporating the defect and placing the expected incisions within the relaxed skin tension lines where possible. The area thus outlined was incised deep to adipose tissue with a #15 scalpel blade.  The skin margins were undermined to an appropriate distance in all directions utilizing iris scissors.

## 2023-08-17 ENCOUNTER — LAB ENCOUNTER (OUTPATIENT)
Dept: LAB | Facility: HOSPITAL | Age: 88
End: 2023-08-17
Attending: INTERNAL MEDICINE
Payer: MEDICARE

## 2023-08-17 DIAGNOSIS — I25.10 CAD (CORONARY ARTERY DISEASE): Primary | ICD-10-CM

## 2023-08-17 DIAGNOSIS — R07.9 CHEST PAIN: ICD-10-CM

## 2023-08-17 LAB
ALBUMIN SERPL-MCNC: 3.4 G/DL (ref 3.4–5)
ALBUMIN/GLOB SERPL: 1 {RATIO} (ref 1–2)
ALP LIVER SERPL-CCNC: 57 U/L
ALT SERPL-CCNC: 24 U/L
ANION GAP SERPL CALC-SCNC: 4 MMOL/L (ref 0–18)
AST SERPL-CCNC: 18 U/L (ref 15–37)
BILIRUB SERPL-MCNC: 0.6 MG/DL (ref 0.1–2)
BUN BLD-MCNC: 22 MG/DL (ref 7–18)
CALCIUM BLD-MCNC: 8.9 MG/DL (ref 8.5–10.1)
CHLORIDE SERPL-SCNC: 109 MMOL/L (ref 98–112)
CHOLEST SERPL-MCNC: 184 MG/DL (ref ?–200)
CO2 SERPL-SCNC: 26 MMOL/L (ref 21–32)
CREAT BLD-MCNC: 1 MG/DL
EGFRCR SERPLBLD CKD-EPI 2021: 73 ML/MIN/1.73M2 (ref 60–?)
FASTING PATIENT LIPID ANSWER: YES
FASTING STATUS PATIENT QL REPORTED: YES
GLOBULIN PLAS-MCNC: 3.3 G/DL (ref 2.8–4.4)
GLUCOSE BLD-MCNC: 138 MG/DL (ref 70–99)
HDLC SERPL-MCNC: 55 MG/DL (ref 40–59)
LDLC SERPL CALC-MCNC: 115 MG/DL (ref ?–100)
NONHDLC SERPL-MCNC: 129 MG/DL (ref ?–130)
OSMOLALITY SERPL CALC.SUM OF ELEC: 294 MOSM/KG (ref 275–295)
POTASSIUM SERPL-SCNC: 4.2 MMOL/L (ref 3.5–5.1)
PROT SERPL-MCNC: 6.7 G/DL (ref 6.4–8.2)
SODIUM SERPL-SCNC: 139 MMOL/L (ref 136–145)
TRIGL SERPL-MCNC: 77 MG/DL (ref 30–149)
VLDLC SERPL CALC-MCNC: 13 MG/DL (ref 0–30)

## 2023-08-17 PROCEDURE — 80061 LIPID PANEL: CPT

## 2023-08-17 PROCEDURE — 80053 COMPREHEN METABOLIC PANEL: CPT

## 2023-08-17 PROCEDURE — 36415 COLL VENOUS BLD VENIPUNCTURE: CPT

## 2024-03-07 ENCOUNTER — LAB ENCOUNTER (OUTPATIENT)
Dept: LAB | Facility: HOSPITAL | Age: 89
End: 2024-03-07
Attending: INTERNAL MEDICINE
Payer: MEDICARE

## 2024-03-07 DIAGNOSIS — R07.9 CHEST PAIN, UNSPECIFIED: Primary | ICD-10-CM

## 2024-03-07 DIAGNOSIS — Z95.1 POSTSURGICAL AORTOCORONARY BYPASS STATUS: ICD-10-CM

## 2024-03-07 DIAGNOSIS — I47.10 PAROXYSMAL SUPRAVENTRICULAR TACHYCARDIA: ICD-10-CM

## 2024-03-07 LAB
ALBUMIN SERPL-MCNC: 3.7 G/DL (ref 3.4–5)
ALBUMIN/GLOB SERPL: 1.1 {RATIO} (ref 1–2)
ALP LIVER SERPL-CCNC: 64 U/L
ALT SERPL-CCNC: 19 U/L
ANION GAP SERPL CALC-SCNC: 3 MMOL/L (ref 0–18)
AST SERPL-CCNC: 20 U/L (ref 15–37)
BILIRUB SERPL-MCNC: 0.8 MG/DL (ref 0.1–2)
BUN BLD-MCNC: 15 MG/DL (ref 9–23)
CALCIUM BLD-MCNC: 8.9 MG/DL (ref 8.5–10.1)
CHLORIDE SERPL-SCNC: 109 MMOL/L (ref 98–112)
CHOLEST SERPL-MCNC: 201 MG/DL (ref ?–200)
CO2 SERPL-SCNC: 25 MMOL/L (ref 21–32)
CREAT BLD-MCNC: 0.85 MG/DL
EGFRCR SERPLBLD CKD-EPI 2021: 84 ML/MIN/1.73M2 (ref 60–?)
FASTING PATIENT LIPID ANSWER: YES
FASTING STATUS PATIENT QL REPORTED: YES
GLOBULIN PLAS-MCNC: 3.4 G/DL (ref 2.8–4.4)
GLUCOSE BLD-MCNC: 125 MG/DL (ref 70–99)
HDLC SERPL-MCNC: 59 MG/DL (ref 40–59)
LDLC SERPL CALC-MCNC: 128 MG/DL (ref ?–100)
NONHDLC SERPL-MCNC: 142 MG/DL (ref ?–130)
OSMOLALITY SERPL CALC.SUM OF ELEC: 286 MOSM/KG (ref 275–295)
POTASSIUM SERPL-SCNC: 4.1 MMOL/L (ref 3.5–5.1)
PROT SERPL-MCNC: 7.1 G/DL (ref 6.4–8.2)
SODIUM SERPL-SCNC: 137 MMOL/L (ref 136–145)
TRIGL SERPL-MCNC: 79 MG/DL (ref 30–149)
VLDLC SERPL CALC-MCNC: 14 MG/DL (ref 0–30)

## 2024-03-07 PROCEDURE — 80061 LIPID PANEL: CPT

## 2024-03-07 PROCEDURE — 80053 COMPREHEN METABOLIC PANEL: CPT

## 2024-03-07 PROCEDURE — 36415 COLL VENOUS BLD VENIPUNCTURE: CPT

## 2024-07-06 ENCOUNTER — LAB ENCOUNTER (OUTPATIENT)
Dept: LAB | Facility: HOSPITAL | Age: 89
End: 2024-07-06
Attending: INTERNAL MEDICINE
Payer: MEDICARE

## 2024-07-06 DIAGNOSIS — R07.9 CHEST PAIN, UNSPECIFIED TYPE: ICD-10-CM

## 2024-07-06 DIAGNOSIS — Z95.1 STATUS POST CORONARY ARTERY BYPASS GRAFT: ICD-10-CM

## 2024-07-06 DIAGNOSIS — R00.0 SINUS TACHYCARDIA: ICD-10-CM

## 2024-07-06 DIAGNOSIS — Z95.1 S/P CABG X 3: ICD-10-CM

## 2024-07-06 DIAGNOSIS — I25.10 CAD (CORONARY ARTERY DISEASE): Primary | ICD-10-CM

## 2024-07-06 LAB
ALT SERPL-CCNC: 22 U/L
AST SERPL-CCNC: 17 U/L (ref 15–37)
CHOLEST SERPL-MCNC: 174 MG/DL (ref ?–200)
FASTING PATIENT LIPID ANSWER: YES
HDLC SERPL-MCNC: 52 MG/DL (ref 40–59)
LDLC SERPL CALC-MCNC: 108 MG/DL (ref ?–100)
NONHDLC SERPL-MCNC: 122 MG/DL (ref ?–130)
TRIGL SERPL-MCNC: 74 MG/DL (ref 30–149)
VLDLC SERPL CALC-MCNC: 13 MG/DL (ref 0–30)

## 2024-07-06 PROCEDURE — 80061 LIPID PANEL: CPT

## 2024-07-06 PROCEDURE — 36415 COLL VENOUS BLD VENIPUNCTURE: CPT

## 2024-07-06 PROCEDURE — 84450 TRANSFERASE (AST) (SGOT): CPT

## 2024-07-06 PROCEDURE — 84460 ALANINE AMINO (ALT) (SGPT): CPT

## 2025-01-15 ENCOUNTER — HOSPITAL ENCOUNTER (OUTPATIENT)
Dept: GENERAL RADIOLOGY | Age: 89
Discharge: HOME | End: 2025-01-15
Attending: FAMILY MEDICINE
Payer: MEDICARE

## 2025-01-15 DIAGNOSIS — M25.562 ARTHRALGIA OF LEFT KNEE: ICD-10-CM

## 2025-01-15 PROCEDURE — 73560 X-RAY EXAM OF KNEE 1 OR 2: CPT | Performed by: FAMILY MEDICINE

## 2025-06-24 ENCOUNTER — LAB ENCOUNTER (OUTPATIENT)
Dept: LAB | Facility: HOSPITAL | Age: 89
End: 2025-06-24
Attending: INTERNAL MEDICINE
Payer: MEDICARE

## 2025-06-24 DIAGNOSIS — E78.5 HYPERLIPEMIA: Primary | ICD-10-CM

## 2025-06-24 LAB
ALBUMIN SERPL-MCNC: 4.3 G/DL (ref 3.2–4.8)
ALBUMIN/GLOB SERPL: 1.9 {RATIO} (ref 1–2)
ALP LIVER SERPL-CCNC: 55 U/L (ref 45–117)
ALT SERPL-CCNC: 13 U/L (ref 10–49)
ANION GAP SERPL CALC-SCNC: 7 MMOL/L (ref 0–18)
AST SERPL-CCNC: 19 U/L (ref ?–34)
BILIRUB SERPL-MCNC: 0.7 MG/DL (ref 0.2–1.1)
BUN BLD-MCNC: 21 MG/DL (ref 9–23)
CALCIUM BLD-MCNC: 9.3 MG/DL (ref 8.7–10.6)
CHLORIDE SERPL-SCNC: 109 MMOL/L (ref 98–112)
CHOLEST SERPL-MCNC: 193 MG/DL (ref ?–200)
CO2 SERPL-SCNC: 24 MMOL/L (ref 21–32)
CREAT BLD-MCNC: 1.03 MG/DL (ref 0.7–1.3)
EGFRCR SERPLBLD CKD-EPI 2021: 70 ML/MIN/1.73M2 (ref 60–?)
FASTING PATIENT LIPID ANSWER: YES
FASTING STATUS PATIENT QL REPORTED: YES
GLOBULIN PLAS-MCNC: 2.3 G/DL (ref 2–3.5)
GLUCOSE BLD-MCNC: 135 MG/DL (ref 70–99)
HDLC SERPL-MCNC: 47 MG/DL (ref 40–59)
LDLC SERPL CALC-MCNC: 128 MG/DL (ref ?–100)
NONHDLC SERPL-MCNC: 146 MG/DL (ref ?–130)
OSMOLALITY SERPL CALC.SUM OF ELEC: 295 MOSM/KG (ref 275–295)
POTASSIUM SERPL-SCNC: 3.9 MMOL/L (ref 3.5–5.1)
PROT SERPL-MCNC: 6.6 G/DL (ref 5.7–8.2)
SODIUM SERPL-SCNC: 140 MMOL/L (ref 136–145)
TRIGL SERPL-MCNC: 98 MG/DL (ref 30–149)
VLDLC SERPL CALC-MCNC: 18 MG/DL (ref 0–30)

## 2025-06-24 PROCEDURE — 80053 COMPREHEN METABOLIC PANEL: CPT

## 2025-06-24 PROCEDURE — 36415 COLL VENOUS BLD VENIPUNCTURE: CPT

## 2025-06-24 PROCEDURE — 80061 LIPID PANEL: CPT

## (undated) DEVICE — CLIP LGT 11MM OPEN 2.8MM 235CM

## (undated) DEVICE — OPEN HEART: Brand: MEDLINE INDUSTRIES, INC.

## (undated) DEVICE — TRAP 4 CPTR CHMBR N EZ INLN

## (undated) DEVICE — SOL LACT RINGERS 1000ML

## (undated) DEVICE — SUTURE WIRE DOUBLE STERNOTOMY

## (undated) DEVICE — 1200CC GUARDIAN II: Brand: GUARDIAN

## (undated) DEVICE — CELL SAVER 5/5+ BOWL KIT-225ML: Brand: HAEMONETICS CELL SAVER 5/5+ SYSTEMS

## (undated) DEVICE — CELL SAVER BAG 600ML 4R2023

## (undated) DEVICE — FILTERLINE NASAL ADULT O2/CO2

## (undated) DEVICE — PDS II VLT 0 107CM AG ST3: Brand: ENDOLOOP

## (undated) DEVICE — NEEDLE CONTRAST INTERJECT 25G

## (undated) DEVICE — SOL  .9 1000ML BTL

## (undated) DEVICE — SUTURE PROLENE 6-0 C-1

## (undated) DEVICE — BONE WAX W31G

## (undated) DEVICE — CELL SAVER RESERVOIR BRAT

## (undated) DEVICE — SUTURE PROLENE 8-0 BV-130-5

## (undated) DEVICE — 3M™ RED DOT™ MONITORING ELECTRODE WITH FOAM TAPE AND STICKY GEL, 50/BAG, 20/CASE, 72/PLT 2570: Brand: RED DOT™

## (undated) DEVICE — SYRINGE 30ML LL TIP

## (undated) DEVICE — SUTURE SILK 0 FSL

## (undated) DEVICE — ANGIO CATH 16GX2

## (undated) DEVICE — Device: Brand: DEFENDO AIR/WATER/SUCTION AND BIOPSY VALVE

## (undated) DEVICE — GLOVE SURG TRIUMPH SZ 8

## (undated) DEVICE — FIXED CORE WIRE GUIDE SAFE-T-J, CURVED: Brand: COOK

## (undated) DEVICE — SUTURE POLYDEK 2-0

## (undated) DEVICE — SNARE 9MM 230CM 2.4MM EXACTO

## (undated) DEVICE — HEMOCLIP HORIZON LG 004200

## (undated) DEVICE — ENDOSCOPY PACK UPPER: Brand: MEDLINE INDUSTRIES, INC.

## (undated) DEVICE — GOWN,SIRUS,FAB REINF,RAGLAN,XL,STERILE: Brand: MEDLINE

## (undated) DEVICE — ENDOSCOPY PACK - LOWER: Brand: MEDLINE INDUSTRIES, INC.

## (undated) DEVICE — GOWN,SIRUS,FABRIC-REINFORCED,X-LARGE: Brand: MEDLINE

## (undated) DEVICE — STERILE POLYISOPRENE POWDER-FREE SURGICAL GLOVES: Brand: PROTEXIS

## (undated) DEVICE — FORCEP BIOPSY RJ4 LG CAP W/ND

## (undated) DEVICE — SUTURE SILK 2-0

## (undated) DEVICE — ORISE GEL

## (undated) DEVICE — SUTURE PROLENE 4-0 V-5

## (undated) DEVICE — Device: Brand: VIRTUOSAPH PLUS WITH RADIAL INDICATION

## (undated) DEVICE — BLOWER CO2 MEDTRONIC/DLP 22150

## (undated) NOTE — LETTER
Kate Smith 182  [address] [facility] [address], [address] [facility]  Authorization for Surgical Operation and Procedure     Date:__5/25/2021_________                                                       Time:___1700_______  I hereby Gabino Craig MD, m hemolytic reactions, transmission of diseases such as Hepatitis, AIDS and Cytomegalovirus (CMV) and fluid overload. In the event that I wish to have an autologous transfusion of my own blood, or a directed donor transfusion.   I will discuss this with my p purposes of reinstating the DNAR order. 10. Patients having a sterilization procedure: I understand that if the procedure is successful the results will be permanent and it will therefore be impossible for me to inseminate, conceive, or bear children.   I necessary. Some examples are: Starting or using an “IV” to give me medicine, fluids or blood during my procedure, and having a breathing tube placed to help me breathe when I’m asleep (intubation).  In the event that my heart stops working properly, I under headache, bleeding, infection, seizure, irregular heart rhythms, and nerve injury.     I can change my mind about having anesthesia services at any time before I get the medicine.    __________________________________________________________________________

## (undated) NOTE — LETTER
[address] [facility] FOR BLOOD OR BLOOD COMPONENTS      In the course of your treatment, it may become necessary to administer a transfusion of blood or blood components.  This form provides basic information concerning this proc alternatives to you if it has not already been done. I,Jack Mckinney, have read/had read to me the above. I understand the matters bearing on the decision whether or not to authorize a transfusion of blood or blood components.  I have no questions which ha

## (undated) NOTE — LETTER
Kate Smith 182  [address] [facility] [address], [address] [facility]  Authorization for Surgical Operation and Procedure     Date:___________                                                                                                         Time:__________ risks that can occur: fever and allergic reactions, hemolytic reactions, transmission of diseases such as Hepatitis, AIDS and Cytomegalovirus (CMV) and fluid overload.   In the event that I wish to have an autologous transfusion of my own blood, or a direct determine when the applicable recovery period ends for purposes of reinstating the DNAR order.   10. Patients having a sterilization procedure: I understand that if the procedure is successful the results will be permanent and it will therefore be impossibl to give me medicine and do additional procedures as necessary. Some examples are: Starting or using an “IV” to give me medicine, fluids or blood during my procedure, and having a breathing tube placed to help me breathe when I’m asleep (intubation).  In the that rare but potential complications include headache, bleeding, infection, seizure, irregular heart rhythms, and nerve injury.     I can change my mind about having anesthesia services at any time before I get the medicine.    ____________________________

## (undated) NOTE — LETTER
[facility] [address], [address] [place] [address]    Consent for Anesthesia   1.    INiesha agree to be cared for by a physician anesthesiologist alone and/or with a nurse anesthetist, who is specially trained to monitor me and give me me allergic reactions to medications, injury to my airway, heart, lungs, vision, nerves, or muscles and in extremely rare instances death. 5. My doctor has explained to me other choices available to me for my care (alternatives).   6. Pregnant Patients (“epid Printed: 5/25/2021 at 4:49 PM    Medical Record #: [id number]                                            Page 1 of 1

## (undated) NOTE — LETTER
[facility] [address], [address] [place] [address]    Consent for Anesthesia   1.    INiesha agree to be cared for by a physician anesthesiologist alone and/or with a nurse anesthetist, who is specially trained to monitor me and give me me allergic reactions to medications, injury to my airway, heart, lungs, vision, nerves, or muscles and in extremely rare instances death. 5. My doctor has explained to me other choices available to me for my care (alternatives).   6. Pregnant Patients (“epid Printed: 5/5/2021 at 3:19 PM    Medical Record #: [id number]                                            Page 1 of 1

## (undated) NOTE — LETTER
[facility] [address], [address]  Consent for Procedure/Sedation    Date: 05/24/2021    Time: 1635        1.  I authorize the performance upon Lilia Leventhal the following:cardiac catheterization, left ventricular cineangiography, Signature of person authorized                                     Relationship to  to consent for patient: _________________________ patient: ___________________    Witness: _______________________________ Date: _____________________    Printed: 5/24/2021

## (undated) NOTE — LETTER
[facility] A. Yanci Mojica M.D., F.A.C.S. Carmon Jeans, M.D., F.A.C.S. Katerine Lincoln M.D., Tg Monk M.D., F.A.C.S. Vasyl Morales. Alex Medrano M.D., F.A.C.S. Armando Conway M.D. Verner Overland, M. Sonia Applebaum A.D. Zoe German, M.D., F. concerns answered. If there are any issues which have not been adequately addressed, we ask you to bring them forward so that we can thoroughly address them.     A patient who is fully informed and understands their condition and options for treatment, as w A [facility] surgeon as explained to me that if I should so desire, he/she is willing to explain my case and the surgical and non-surgical options to family members:             Yes _____ No _____    A [facility] surgeon has answered all of my questions regarding the t